# Patient Record
Sex: FEMALE | Race: WHITE | Employment: OTHER | URBAN - METROPOLITAN AREA
[De-identification: names, ages, dates, MRNs, and addresses within clinical notes are randomized per-mention and may not be internally consistent; named-entity substitution may affect disease eponyms.]

---

## 2017-11-30 ENCOUNTER — AMBULATORY - HEALTHEAST (OUTPATIENT)
Dept: NURSING | Facility: CLINIC | Age: 72
End: 2017-11-30

## 2017-12-08 ENCOUNTER — RECORDS - HEALTHEAST (OUTPATIENT)
Dept: ADMINISTRATIVE | Facility: OTHER | Age: 72
End: 2017-12-08

## 2018-05-23 ENCOUNTER — RECORDS - HEALTHEAST (OUTPATIENT)
Dept: ADMINISTRATIVE | Facility: OTHER | Age: 73
End: 2018-05-23

## 2018-06-20 ENCOUNTER — RECORDS - HEALTHEAST (OUTPATIENT)
Dept: ADMINISTRATIVE | Facility: OTHER | Age: 73
End: 2018-06-20

## 2019-07-16 ENCOUNTER — AMBULATORY - HEALTHEAST (OUTPATIENT)
Dept: INTERNAL MEDICINE | Facility: CLINIC | Age: 74
End: 2019-07-16

## 2019-07-16 DIAGNOSIS — R05.9 COUGH: ICD-10-CM

## 2019-07-16 DIAGNOSIS — R91.8 PULMONARY NODULES: ICD-10-CM

## 2019-07-17 ENCOUNTER — HOSPITAL ENCOUNTER (OUTPATIENT)
Dept: RADIOLOGY | Facility: HOSPITAL | Age: 74
Discharge: HOME | End: 2019-07-17
Attending: INTERNAL MEDICINE

## 2019-07-17 DIAGNOSIS — R91.8 PULMONARY NODULES: ICD-10-CM

## 2019-07-17 DIAGNOSIS — R05.9 COUGH: ICD-10-CM

## 2019-07-19 ENCOUNTER — COMMUNICATION - HEALTHEAST (OUTPATIENT)
Dept: INTERNAL MEDICINE | Facility: CLINIC | Age: 74
End: 2019-07-19

## 2019-08-29 ENCOUNTER — OFFICE VISIT - HEALTHEAST (OUTPATIENT)
Dept: INTERNAL MEDICINE | Facility: CLINIC | Age: 74
End: 2019-08-29

## 2019-08-29 ENCOUNTER — COMMUNICATION - HEALTHEAST (OUTPATIENT)
Dept: INTERNAL MEDICINE | Facility: CLINIC | Age: 74
End: 2019-08-29

## 2019-08-29 DIAGNOSIS — Z78.0 POSTMENOPAUSAL STATUS: ICD-10-CM

## 2019-08-29 DIAGNOSIS — R05.8 NOCTURNAL COUGH: ICD-10-CM

## 2019-08-29 DIAGNOSIS — Z71.6 ENCOUNTER FOR TOBACCO USE CESSATION COUNSELING: ICD-10-CM

## 2019-08-29 DIAGNOSIS — M53.3 SACROILIAC JOINT PAIN: ICD-10-CM

## 2019-08-29 DIAGNOSIS — M89.9 BONE DISEASE: ICD-10-CM

## 2019-08-29 DIAGNOSIS — R21 PERIANAL RASH: ICD-10-CM

## 2019-08-29 DIAGNOSIS — Z12.31 VISIT FOR SCREENING MAMMOGRAM: ICD-10-CM

## 2019-08-29 DIAGNOSIS — Z00.00 MEDICARE ANNUAL WELLNESS VISIT, SUBSEQUENT: ICD-10-CM

## 2019-08-29 LAB
ALBUMIN SERPL-MCNC: 3.6 G/DL (ref 3.5–5)
ALBUMIN UR-MCNC: NEGATIVE MG/DL
ALP SERPL-CCNC: 98 U/L (ref 45–120)
ALT SERPL W P-5'-P-CCNC: 10 U/L (ref 0–45)
ANION GAP SERPL CALCULATED.3IONS-SCNC: 8 MMOL/L (ref 5–18)
APPEARANCE UR: ABNORMAL
AST SERPL W P-5'-P-CCNC: 20 U/L (ref 0–40)
ATRIAL RATE - MUSE: 49 BPM
BACTERIA #/AREA URNS HPF: ABNORMAL HPF
BASOPHILS # BLD AUTO: 0.1 THOU/UL (ref 0–0.2)
BASOPHILS NFR BLD AUTO: 1 % (ref 0–2)
BILIRUB DIRECT SERPL-MCNC: 0.2 MG/DL
BILIRUB SERPL-MCNC: 0.4 MG/DL (ref 0–1)
BILIRUB UR QL STRIP: NEGATIVE
BUN SERPL-MCNC: 20 MG/DL (ref 8–28)
CALCIUM SERPL-MCNC: 9.5 MG/DL (ref 8.5–10.5)
CHLORIDE BLD-SCNC: 107 MMOL/L (ref 98–107)
CHOLEST SERPL-MCNC: 201 MG/DL
CO2 SERPL-SCNC: 27 MMOL/L (ref 22–31)
COLOR UR AUTO: YELLOW
CREAT SERPL-MCNC: 0.91 MG/DL (ref 0.6–1.1)
DIASTOLIC BLOOD PRESSURE - MUSE: NORMAL MMHG
EOSINOPHIL # BLD AUTO: 0.5 THOU/UL (ref 0–0.4)
EOSINOPHIL NFR BLD AUTO: 4 % (ref 0–6)
ERYTHROCYTE [DISTWIDTH] IN BLOOD BY AUTOMATED COUNT: 12.1 % (ref 11–14.5)
ERYTHROCYTE [SEDIMENTATION RATE] IN BLOOD BY WESTERGREN METHOD: 23 MM/HR (ref 0–20)
FASTING STATUS PATIENT QL REPORTED: YES
GFR SERPL CREATININE-BSD FRML MDRD: >60 ML/MIN/1.73M2
GLUCOSE BLD-MCNC: 96 MG/DL (ref 70–125)
GLUCOSE UR STRIP-MCNC: NEGATIVE MG/DL
HCT VFR BLD AUTO: 42.7 % (ref 35–47)
HDLC SERPL-MCNC: 41 MG/DL
HGB BLD-MCNC: 14.3 G/DL (ref 12–16)
HGB UR QL STRIP: ABNORMAL
INTERPRETATION ECG - MUSE: NORMAL
KETONES UR STRIP-MCNC: NEGATIVE MG/DL
LDLC SERPL CALC-MCNC: 136 MG/DL
LEUKOCYTE ESTERASE UR QL STRIP: NEGATIVE
LYMPHOCYTES # BLD AUTO: 3.3 THOU/UL (ref 0.8–4.4)
LYMPHOCYTES NFR BLD AUTO: 30 % (ref 20–40)
MCH RBC QN AUTO: 30 PG (ref 27–34)
MCHC RBC AUTO-ENTMCNC: 33.4 G/DL (ref 32–36)
MCV RBC AUTO: 90 FL (ref 80–100)
MONOCYTES # BLD AUTO: 1 THOU/UL (ref 0–0.9)
MONOCYTES NFR BLD AUTO: 9 % (ref 2–10)
MUCOUS THREADS #/AREA URNS LPF: ABNORMAL LPF
NEUTROPHILS # BLD AUTO: 6 THOU/UL (ref 2–7.7)
NEUTROPHILS NFR BLD AUTO: 55 % (ref 50–70)
NITRATE UR QL: NEGATIVE
P AXIS - MUSE: 64 DEGREES
PH UR STRIP: 5.5 [PH] (ref 5–8)
PLATELET # BLD AUTO: 263 THOU/UL (ref 140–440)
PMV BLD AUTO: 8.5 FL (ref 7–10)
POTASSIUM BLD-SCNC: 4.6 MMOL/L (ref 3.5–5)
PR INTERVAL - MUSE: 170 MS
PROT SERPL-MCNC: 7.8 G/DL (ref 6–8)
QRS DURATION - MUSE: 84 MS
QT - MUSE: 416 MS
QTC - MUSE: 375 MS
R AXIS - MUSE: 39 DEGREES
RBC # BLD AUTO: 4.75 MILL/UL (ref 3.8–5.4)
RBC #/AREA URNS AUTO: ABNORMAL HPF
SODIUM SERPL-SCNC: 142 MMOL/L (ref 136–145)
SP GR UR STRIP: 1.02 (ref 1–1.03)
SQUAMOUS #/AREA URNS AUTO: ABNORMAL LPF
SYSTOLIC BLOOD PRESSURE - MUSE: NORMAL MMHG
T AXIS - MUSE: 48 DEGREES
TRIGL SERPL-MCNC: 118 MG/DL
TSH SERPL DL<=0.005 MIU/L-ACNC: 2.61 UIU/ML (ref 0.3–5)
UROBILINOGEN UR STRIP-ACNC: ABNORMAL
VENTRICULAR RATE- MUSE: 49 BPM
WBC #/AREA URNS AUTO: ABNORMAL HPF
WBC: 10.9 THOU/UL (ref 4–11)

## 2019-08-29 ASSESSMENT — MIFFLIN-ST. JEOR: SCORE: 1377.82

## 2019-08-30 ENCOUNTER — COMMUNICATION - HEALTHEAST (OUTPATIENT)
Dept: INTERNAL MEDICINE | Facility: CLINIC | Age: 74
End: 2019-08-30

## 2019-08-30 LAB — 25(OH)D3 SERPL-MCNC: 16.8 NG/ML (ref 30–80)

## 2019-10-07 ENCOUNTER — RECORDS - HEALTHEAST (OUTPATIENT)
Dept: ADMINISTRATIVE | Facility: OTHER | Age: 74
End: 2019-10-07

## 2020-04-09 ENCOUNTER — AMBULATORY - HEALTHEAST (OUTPATIENT)
Dept: INTERNAL MEDICINE | Facility: CLINIC | Age: 75
End: 2020-04-09

## 2020-04-09 DIAGNOSIS — R21 RASH: ICD-10-CM

## 2020-09-23 ENCOUNTER — OFFICE VISIT - HEALTHEAST (OUTPATIENT)
Dept: INTERNAL MEDICINE | Facility: CLINIC | Age: 75
End: 2020-09-23

## 2020-09-23 DIAGNOSIS — I83.93 ASYMPTOMATIC VARICOSE VEINS OF BOTH LOWER EXTREMITIES: ICD-10-CM

## 2020-09-23 DIAGNOSIS — R03.0 ELEVATED BLOOD PRESSURE READING WITHOUT DIAGNOSIS OF HYPERTENSION: ICD-10-CM

## 2020-09-23 DIAGNOSIS — M53.3 SI (SACROILIAC) JOINT DYSFUNCTION: ICD-10-CM

## 2020-09-23 DIAGNOSIS — I65.23 CAROTID ATHEROSCLEROSIS, BILATERAL: ICD-10-CM

## 2020-09-23 DIAGNOSIS — Z23 NEED FOR INFLUENZA VACCINATION: ICD-10-CM

## 2020-09-23 DIAGNOSIS — E66.09 CLASS 1 OBESITY DUE TO EXCESS CALORIES WITHOUT SERIOUS COMORBIDITY WITH BODY MASS INDEX (BMI) OF 34.0 TO 34.9 IN ADULT: ICD-10-CM

## 2020-09-23 DIAGNOSIS — J30.1 SEASONAL ALLERGIC RHINITIS DUE TO POLLEN: ICD-10-CM

## 2020-09-23 DIAGNOSIS — E66.811 CLASS 1 OBESITY DUE TO EXCESS CALORIES WITHOUT SERIOUS COMORBIDITY WITH BODY MASS INDEX (BMI) OF 34.0 TO 34.9 IN ADULT: ICD-10-CM

## 2020-09-23 DIAGNOSIS — K57.30 DIVERTICULAR DISEASE OF LARGE INTESTINE: ICD-10-CM

## 2020-09-23 DIAGNOSIS — E56.9 VITAMIN DEFICIENCY: ICD-10-CM

## 2020-09-23 DIAGNOSIS — E78.5 HYPERLIPIDEMIA LDL GOAL <100: ICD-10-CM

## 2020-09-23 DIAGNOSIS — Z72.0 TOBACCO ABUSE: ICD-10-CM

## 2020-09-23 DIAGNOSIS — B35.6 DERMATOPHYTOSIS OF GROIN: ICD-10-CM

## 2020-09-23 LAB
ALBUMIN SERPL-MCNC: 3.6 G/DL (ref 3.5–5)
ALP SERPL-CCNC: 92 U/L (ref 45–120)
ALT SERPL W P-5'-P-CCNC: 10 U/L (ref 0–45)
ANION GAP SERPL CALCULATED.3IONS-SCNC: 10 MMOL/L (ref 5–18)
AST SERPL W P-5'-P-CCNC: 24 U/L (ref 0–40)
BILIRUB SERPL-MCNC: 0.3 MG/DL (ref 0–1)
BUN SERPL-MCNC: 17 MG/DL (ref 8–28)
CALCIUM SERPL-MCNC: 9.2 MG/DL (ref 8.5–10.5)
CHLORIDE BLD-SCNC: 107 MMOL/L (ref 98–107)
CO2 SERPL-SCNC: 24 MMOL/L (ref 22–31)
CREAT SERPL-MCNC: 0.98 MG/DL (ref 0.6–1.1)
GFR SERPL CREATININE-BSD FRML MDRD: 56 ML/MIN/1.73M2
GLUCOSE BLD-MCNC: 102 MG/DL (ref 70–125)
POTASSIUM BLD-SCNC: 4.2 MMOL/L (ref 3.5–5)
PROT SERPL-MCNC: 7.2 G/DL (ref 6–8)
SODIUM SERPL-SCNC: 141 MMOL/L (ref 136–145)

## 2020-09-23 RX ORDER — CETIRIZINE HYDROCHLORIDE 10 MG/1
10 TABLET ORAL DAILY
Status: SHIPPED | COMMUNITY
Start: 2020-09-23

## 2020-09-24 ENCOUNTER — COMMUNICATION - HEALTHEAST (OUTPATIENT)
Dept: INTERNAL MEDICINE | Facility: CLINIC | Age: 75
End: 2020-09-24

## 2020-09-24 ENCOUNTER — AMBULATORY - HEALTHEAST (OUTPATIENT)
Dept: INTERNAL MEDICINE | Facility: CLINIC | Age: 75
End: 2020-09-24

## 2020-09-24 DIAGNOSIS — E55.9 VITAMIN D DEFICIENCY: ICD-10-CM

## 2020-09-24 LAB — 25(OH)D3 SERPL-MCNC: 18.3 NG/ML (ref 30–80)

## 2020-12-18 ENCOUNTER — COMMUNICATION - HEALTHEAST (OUTPATIENT)
Dept: INTERNAL MEDICINE | Facility: CLINIC | Age: 75
End: 2020-12-18

## 2020-12-21 ENCOUNTER — COMMUNICATION - HEALTHEAST (OUTPATIENT)
Dept: INTERNAL MEDICINE | Facility: CLINIC | Age: 75
End: 2020-12-21

## 2021-02-13 ENCOUNTER — COMMUNICATION - HEALTHEAST (OUTPATIENT)
Dept: INTERNAL MEDICINE | Facility: CLINIC | Age: 76
End: 2021-02-13

## 2021-02-13 DIAGNOSIS — R21 RASH: ICD-10-CM

## 2021-02-15 RX ORDER — DESONIDE 0.5 MG/G
CREAM TOPICAL
Qty: 60 G | Refills: 1 | Status: SHIPPED | OUTPATIENT
Start: 2021-02-15 | End: 2021-09-09

## 2021-05-30 NOTE — TELEPHONE ENCOUNTER
----- Message from Juice Machuca MD sent at 7/16/2019 10:00 PM CDT -----  Regarding: cough  Call patient    Lets get a cxr this week and see what it shows-at any [address]  I will call you today Wednesday or Thursday    Juice Machuca MD  Internal medicine  [facility]  [phone]  [email or web address]

## 2021-05-31 NOTE — PROGRESS NOTES
Assessment and Plan:        1. Medicare annual wellness visit, subsequent  Completed  - 1(CBC and Differential)  - Erythrocyte Sedimentation Rate  - Urinalysis-UC if Indicated  - Basic Metabolic Panel  - Hepatic Profile  - Lipid Cascade  - Thyroid Cascade  - Vitamin D, Total (25-Hydroxy)  - Electrocardiogram Perform - Clinic  - 1 (CBC with Diff)    2. Visit for screening mammogram     - Mammo Screening Bilateral; Future    3. Nocturnal cough  Chronic.  With sinus drainage.  Recent x-ray negative.  No hoarseness.  Tobacco cessation.  Smokes about 2 cigarettes a week    4. Encounter for tobacco use cessation counseling  Completed    5. Sacroiliac joint pain  Trial Celebrex  X-ray  Cortisone injection if no change  - XR Pelvis AP; Future  - meloxicam (MOBIC) 15 MG tablet; Take 1 tablet (15 mg total) by mouth daily. For sacro iliac pain  Dispense: 30 tablet; Refill: 11    6. Perianal rash  Discontinue harsh cleansing  Barrier cream  - triamcinolone (KENALOG) 0.1 % cream; Apply topically 2 (two) times a day for 14 days.  Dispense: 45 g; Refill: 3    7. Postmenopausal status       8. Bone disease      - Vitamin D, Total (25-Hydroxy)     The patient's current medical problems were reviewed.    I have counseled the patient for tobacco cessation and the follow up will occur  at the next visit.  The following health maintenance schedule was reviewed with the patient and provided in printed form in the after visit summary:   Health Maintenance   Topic Date Due     HEPATITIS C SCREENING  1945     ZOSTER VACCINES (1 of 2) 05/12/1997     DXA SCAN  05/12/2012     PNEUMOCOCCAL POLYSACCHARIDE VACCINE AGE 65 AND OVER  05/12/2012     MEDICARE ANNUAL WELLNESS VISIT  05/05/2017     MAMMOGRAM  05/06/2018     INFLUENZA VACCINE RULE BASED (1) 08/01/2019     FALL RISK ASSESSMENT  08/29/2020     ADVANCE CARE PLANNING  05/05/2021     TD 18+ HE  11/26/2024     COLONOSCOPY  12/08/2026     PNEUMOCOCCAL CONJUGATE VACCINE FOR ADULTS  (PCV13 OR PREVNAR)  Completed        In addition to the wellness examination additional time was spent addressing the following listed problems.   Above listed    In doing so, this provider spent greater than 25 min. face-to-face time with the patient and/or his family.  More than half this time was spent in counseling and or coordination of care which was consistent with the nature of this patient's problems which are listed and described in the assessment and plan.          Juice Machuca MD  Internal medicine  [facility]  [phone]  [email or web address]      Subjective:   Chief Complaint: Lisa Jolley is an 74 y.o. female here for an Annual Wellness visit.   HPI: Here for wellness    Follow-up chronic medical issues    Chronic cough  Mild sinus drainage  No hemoptysis  8 cigarettes a month  No hemoptysis  No breathing issues  I had an x-ray performed this summer which was unremarkable  Clear sinus drainage chronic many years.  No purulence  Voice okay    New issue  Pain left lumbar sacral area with some gluteal radiation  She has been moving to home.  Packing unloading etc.  Several months  Notes with relations  He notes with walking  Otherwise negative nocturnal  Does not hurt elsewhere  Has not really taken much for it.  Tylenol PRN Advil not much help.    No hematuria  History of cystoscopy are unremarkable.      Review of Systems:     Appetite is good  No reflux  Normal bowel movement  Some urinary urgency  Mild dyspareunia-dryness   Lubricant aids  Sleep is good    please see above.  The rest of the review of systems are negative for all systems.    Patient Care Team:  Juice Machuca MD as PCP - General (Internal Medicine)     There is no problem list on file for this patient.    Past Medical History:   Diagnosis Date     Cholelithiasis     ct 2013     Chronic cystitis     cysto 7/2013     Chronic sinusitis      Endometriosis      Low serum vitamin D       Obesity     BMI 35     Osteoarthritis      Pulmonary nodule     Right upper lobe-benign      Past Surgical History:   Procedure Laterality Date     CYSTOSCOPY  2006     CYSTOSCOPY N/A 10/31/2016    Procedure: CYSTOSCOPY, BLADDER BIOPSIES;  Surgeon: Remi Montenegro MD;  Location: [facility] OR;  Service:      HYSTERECTOMY       KNEE ARTHROSCOPY Right 2011     NASAL SEPTUM SURGERY        Family History   Problem Relation Age of Onset     Diabetes Mother      Heart disease Father      Melanoma Sister      Diabetes Brother      Hypertension Son       Social History     Socioeconomic History     Marital status:      Spouse name: Not on file     Number of children: Not on file     Years of education: Not on file     Highest education level: Not on file   Occupational History     Not on file   Social Needs     Financial resource strain: Not on file     Food insecurity:     Worry: Not on file     Inability: Not on file     Transportation needs:     Medical: Not on file     Non-medical: Not on file   Tobacco Use     Smoking status: Current Some Day Smoker     Packs/day: 0.25     Smokeless tobacco: Never Used   Substance and Sexual Activity     Alcohol use: Yes     Comment: rare     Drug use: No     Sexual activity: Not on file   Lifestyle     Physical activity:     Days per week: Not on file     Minutes per session: Not on file     Stress: Not on file   Relationships     Social connections:     Talks on phone: Not on file     Gets together: Not on file     Attends Confucianism service: Not on file     Active member of club or organization: Not on file     Attends meetings of clubs or organizations: Not on file     Relationship status: Not on file     Intimate partner violence:     Fear of current or ex partner: Not on file     Emotionally abused: Not on file     Physically abused: Not on file     Forced sexual activity: Not on file   Other Topics Concern     Not on file   Social History Narrative    MARAL     "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 For sacro iliac pain 30 tablet 11     triamcinolone (KENALOG) 0.1 % cream Apply topically 2 (two) times a day for 14 days. 45 g 3     No current facility-administered medications for this visit.       Objective:   Vital Signs:   Visit Vitals  /72 (Patient Site: Left Arm, Patient Position: Sitting, Cuff Size: Adult Large)   Pulse 67   Ht 5' 4.5\" (1.638 m)   Wt 195 lb 1.3 oz (88.5 kg)   LMP  (LMP Unknown)   SpO2 96%   Breastfeeding? No   BMI 32.97 kg/m         VisionScreening:  No exam data present     PHYSICAL EXAM  Pleasant  Mini-Mental Status performed      Skin: Normal. No rash or lesion  Lymph Nodes: None palpable-including neck, axilla, inguinal, epitrochlear.  Head:  Normocephalic.    Eyes: Midline.  Equal size., full ROM.  External exams normal.  No icterus  Ears:  Normal pinnae, canals, and TM's.    Nose:  Patent, without deformity.    Throat:  Moist mucous membranes without lesions, erythema, or exudate.    Neck: No palpable masses, lymphadenopathy or tenderness.No thyromegaly or goiter.  No thyroid nodule.  Carotid Arteries:  No Bruit.  Carotid upstroke normal  Chest Wall: No deformity or pain elicited on compression.  Hypertrophic right navicular sternal joint.  Chronic.  Nontender.  Axilla-negative nodes  Breast-unremarkable no mass  Respiratory:  Normal respiratory effort.  Lungs are clear with good breath sounds.  No dullness.  No wheezing.  Heart: Regular rhythm.  Normal sounding S1, S2 without S3, S4, murmurs, rubs, or gallops.    Abdomen:  The abdomen was flat, soft and nontender without guarding rebound or masses.  There are normal bowel sounds.  There is no hepatosplenomegaly.  There is no palpable enlargement of the aorta.  Rectal- perianal rash.  Intergluteal " rash.  Chronic appearing.  No primary lesion  Extremities:  Full ROM without limitation, deformity or edema.    4+ pulses  Neurologic-intact- No focal deficit.  Speech clear.  Coordination normal.  Strength symmetric  Pain over left SI.  Slight pain with range of motion left hip      Assessment Results 8/29/2019   Activities of Daily Living No help needed   Instrumental Activities of Daily Living No help needed   Get Up and Go Score Less than 12 seconds   Mini Cog Total Score 3   Some recent data might be hidden     A Mini-Cog score of 0-2 suggests the possibility of dementia, score of 3-5 suggests no dementia    Identified Health Risks:     She is at risk for lack of exercise and has been provided with information to increase physical activity for the benefit of her well-being.  Patient's advanced directive was discussed

## 2021-05-31 NOTE — TELEPHONE ENCOUNTER
----- Message from Juice Machuca MD sent at 8/30/2019  2:42 PM CDT -----  Call    Vit d low    rx  Vit d 1000 iu a day  Get otc

## 2021-05-31 NOTE — TELEPHONE ENCOUNTER
Left message to call back for: Lisa  Information to relay to patient:  See below    Radha Argueta CMA ([facility])

## 2021-05-31 NOTE — TELEPHONE ENCOUNTER
----- Message from Juice Machuca MD sent at 8/29/2019  9:46 AM CDT -----  Call patient  Have mild lumbar arthritis  Hip ok

## 2021-05-31 NOTE — TELEPHONE ENCOUNTER
Left message to call back for: Results  Information to relay to patient:  See below message from PCP.

## 2021-06-03 VITALS — BODY MASS INDEX: 32.5 KG/M2 | WEIGHT: 195.08 LBS | HEIGHT: 65 IN

## 2021-06-05 VITALS
SYSTOLIC BLOOD PRESSURE: 140 MMHG | DIASTOLIC BLOOD PRESSURE: 84 MMHG | OXYGEN SATURATION: 94 % | HEART RATE: 73 BPM | WEIGHT: 202.2 LBS | BODY MASS INDEX: 34.17 KG/M2

## 2021-06-11 NOTE — PATIENT INSTRUCTIONS - HE
72-year-old woman, previous patient Dr. Juice Hurst, establishing primary care with me.  Issues are obesity with body mass and active 34, I would like to see her lose about 20 to 25 pounds of the next 6 months.  Mildly elevated systolic blood pressure noted today of 140/84.  Chronic groin dermatitis, for which she gets relief from steroid cream.  Chronic nocturnal cough with sinus drainage, negative chest x-ray July 2019.  Light cigarette smoker of about 10 cigarettes a week, but she needs to stop even that.  Chronic sacroiliac joint pain which is not too bothersome.  Mild atherosclerosis in bilateral carotid arteries demonstrated on ultrasound May 6, 2016.  History of vitamin D deficiency as measured August 29, 2019, recheck vitamin D level today.  Mild hyperlipidemia.  Post menopause, satisfactory bone density scan November 2012, status post hysterectomy.  Due for screening mammogram, last one satisfactory October 7, 2019.  Satisfactory screening colonoscopy most recently performed December 2016, Diverticulosis, does not need any further screening. History of GI bleeding and diagnosed with a duodenal ulcer in 2016  Allergic rhinitis takes daily Zyrtec, okay to continue.  Bilateral varicose veins with spider veins in both thighs.  We will administer seasonal flu vaccine and pneumococcal 23 polysaccharide today.    She is not fasting today, so we will check lipids.  However let us check a comprehensive metabolic panel and vitamin D level.    Going issue by issue:    Obesity with body mass and active 34, I would like to see her lose about 20 to 25 pounds of the next 6 months.  Losing the weight will help with her sacroiliac joint pain, will also help with her varicose veins and make her lipids better.  Fortunately, she has no history of hypertension, no diabetes either.  I asked her to focus on eating slower, controlling portion size, and identifying problem foods to curtail or eliminate, especially starchy foods,  sweets, calorie dense foods.  She to me that alcohol consumption is minimal, practically 0.    Mildly elevated systolic blood pressure noted today of 140/84, but previous blood pressures have been good.  She may be a bit nervous today.  I encouraged her to measure her blood pressure outside the clinic.  She might even to buy her own home machine, save the receipt, and bring the machine to the clinic so that we can validate it against a manual reading.  I would like to see her resting systolic blood pressure less than 130.    Chronic groin dermatitis, for which she gets relief from steroid cream.  Okay to continue the desonide cream for the groin.  This is probably skin irritation from friction.  Does not seem to be fungal.    Chronic nocturnal cough with sinus drainage, negative chest x-ray July 2019.     Light cigarette smoker of about 10 cigarettes a week, but she needs to stop even that.      Chronic sacroiliac joint pain which is not too bothersome.      Mild atherosclerosis in bilateral carotid arteries demonstrated on ultrasound May 6, 2016.  Knowing that she has some deposits in her carotid arteries, thus another reason to stop smoking.    History of vitamin D deficiency as measured August 29, 2019, recheck vitamin D level today.      Mild hyperlipidemia, but no history of cardiovascular events.  Lipid profile from August 29, 2019 showed total cholesterol 201, triglycerides 118, HDL good cholesterol bit low at 41, LDL bad cholesterol bit elevated at 136.  Would recommend a heart healthy diet, for example to Mediterranean diet.    Post menopause, satisfactory bone density scan November 2012, status post hysterectomy.      Due for screening mammogram, last one satisfactory October 7, 2019.      Satisfactory screening colonoscopy most recently performed December 2016, does not need any further screening.  Diverticulosis.    History of GI bleeding and diagnosed with a duodenal ulcer in 2016    Allergic rhinitis  takes daily Zyrtec, okay to continue.      Bilateral varicose veins with spider veins in both thighs.      We will administer seasonal flu vaccine and pneumococcal 23 polysaccharide today.

## 2021-06-11 NOTE — PROGRESS NOTES
Office Visit - Follow Up   Lisa Jolley   75 y.o. female    Date of Visit: 9/23/2020    Chief Complaint   Patient presents with     Establish Care     Est.Care        -------------------------------------------------------------------------------------------------------------------------  Assessment and Plan    72-year-old woman, previous patient Dr. Juice Hurst, establishing primary care with me.  Issues are obesity with body mass and active 34, I would like to see her lose about 20 to 25 pounds of the next 6 months.  Mildly elevated systolic blood pressure noted today of 140/84.  Chronic groin dermatitis, for which she gets relief from steroid cream.  Chronic nocturnal cough with sinus drainage, negative chest x-ray July 2019.  Light cigarette smoker of about 10 cigarettes a week, but she needs to stop even that.  Chronic sacroiliac joint pain which is not too bothersome.  Mild atherosclerosis in bilateral carotid arteries demonstrated on ultrasound May 6, 2016.  History of vitamin D deficiency as measured August 29, 2019, recheck vitamin D level today.  Mild hyperlipidemia.  Post menopause, satisfactory bone density scan November 2012, status post hysterectomy.  Due for screening mammogram, last one satisfactory October 7, 2019.  Satisfactory screening colonoscopy most recently performed December 2016, Diverticulosis, does not need any further screening. History of GI bleeding and diagnosed with a duodenal ulcer in 2016  Allergic rhinitis takes daily Zyrtec, okay to continue.  Bilateral varicose veins with spider veins in both thighs.  We will administer seasonal flu vaccine and pneumococcal 23 polysaccharide today.    She is not fasting today, so we will check lipids.  However let us check a comprehensive metabolic panel and vitamin D level.    Going issue by issue:    Obesity with body mass and active 34, I would like to see her lose about 20 to 25 pounds of the next 6 months.  Losing the weight will  help with her sacroiliac joint pain, will also help with her varicose veins and make her lipids better.  Fortunately, she has no history of hypertension, no diabetes either.  I asked her to focus on eating slower, controlling portion size, and identifying problem foods to curtail or eliminate, especially starchy foods, sweets, calorie dense foods.  She to me that alcohol consumption is minimal, practically 0.    Mildly elevated systolic blood pressure noted today of 140/84, but previous blood pressures have been good.  She may be a bit nervous today.  I encouraged her to measure her blood pressure outside the clinic.  She might even to buy her own home machine, save the receipt, and bring the machine to the clinic so that we can validate it against a manual reading.  I would like to see her resting systolic blood pressure less than 130.    Chronic groin dermatitis, for which she gets relief from steroid cream.  Okay to continue the desonide cream for the groin.  This is probably skin irritation from friction.  Does not seem to be fungal.    Chronic nocturnal cough with sinus drainage, negative chest x-ray July 2019.     Light cigarette smoker of about 10 cigarettes a week, but she needs to stop even that.      Chronic sacroiliac joint pain which is not too bothersome.      Mild atherosclerosis in bilateral carotid arteries demonstrated on ultrasound May 6, 2016.  Knowing that she has some deposits in her carotid arteries, thus another reason to stop smoking.    History of vitamin D deficiency as measured August 29, 2019, recheck vitamin D level today.      Mild hyperlipidemia, but no history of cardiovascular events.  Lipid profile from August 29, 2019 showed total cholesterol 201, triglycerides 118, HDL good cholesterol bit low at 41, LDL bad cholesterol bit elevated at 136.  Would recommend a heart healthy diet, for example to Mediterranean diet.    Post menopause, satisfactory bone density scan November 2012,  status post hysterectomy.      Due for screening mammogram, last one satisfactory October 7, 2019.      Satisfactory screening colonoscopy most recently performed December 2016, does not need any further screening.  Diverticulosis.    History of GI bleeding and diagnosed with a duodenal ulcer in 2016    Allergic rhinitis takes daily Zyrtec, okay to continue.      Bilateral varicose veins with spider veins in both thighs.      We will administer seasonal flu vaccine and pneumococcal 23 polysaccharide today.      --------------------------------------------------------------------------------------------------------------------------  History of Present Illness  This 75 y.o. old  woman, previous patient Dr. Juice Hurst, establishing primary care with me.  Issues are obesity with body mass and active 34, I would like to see her lose about 20 to 25 pounds of the next 6 months.  Mildly elevated systolic blood pressure noted today of 140/84.  Chronic groin dermatitis, for which she gets relief from steroid cream.  Chronic nocturnal cough with sinus drainage, negative chest x-ray July 2019.  Light cigarette smoker of about 10 cigarettes a week, but she needs to stop even that.  Chronic sacroiliac joint pain which is not too bothersome.  Mild atherosclerosis in bilateral carotid arteries demonstrated on ultrasound May 6, 2016.  History of vitamin D deficiency as measured August 29, 2019, recheck vitamin D level today.  Mild hyperlipidemia.  Post menopause, satisfactory bone density scan November 2012, status post hysterectomy.  Due for screening mammogram, last one satisfactory October 7, 2019.  Satisfactory screening colonoscopy most recently performed December 2016, Diverticulosis, does not need any further screening. History of GI bleeding and diagnosed with a duodenal ulcer in 2016  Allergic rhinitis takes daily Zyrtec, okay to continue.  Bilateral varicose veins with spider veins in both thighs.  We will administer  seasonal flu vaccine and pneumococcal 23 polysaccharide today.    BMI 34  Wt Readings from Last 3 Encounters:   09/23/20 202 lb 3.2 oz (91.7 kg)   08/29/19 195 lb 1.3 oz (88.5 kg)   12/19/16 203 lb 0.6 oz (92.1 kg)     Lab Results   Component Value Date    TSH 2.61 08/29/2019     Dermatitis groin  Steroid cream helps    Nocturnal cough  Chronic.  With sinus drainage.   Recent x-ray negative.  EXAM: XR CHEST 2 VIEWS  LOCATION: [facility]  DATE/TIME: 7/17/2019 9:11 AM  INDICATION: Cough  COMPARISON: 9/17/2016  FINDINGS: Negative chest.     Still smoking: 10 cigs a week  Started age 14, always a light smoker  At most halfa ppd    Sacroiliac joint pain-- not bothersome  Feels a clunk  Stopped meloxicam  EXAM: XR PELVIS AP  LOCATION: [facility]  DATE/TIME: 8/29/2019 9:31 AM  INDICATION: Sacrococcygeal disorders, not elsewhere classified  COMPARISON: None.  FINDINGS: Negative pelvis. No fracture or dislocation. Degenerative changes are seen in the lower lumbar spine.    US CAROTIDS DUPLEX BILAT  5/6/2016 10:45 AM  INDICATION: Asymptomatic left carotid stenosis.  TECHNIQUE: Duplex exam performed utilizing 2D gray-scale imaging, Doppler   interrogation with color-flow and spectral waveform analysis.  COMPARISON: None.  FINDINGS:  RIGHT: There is mild atheromatous plaque. Normal waveforms with no   significant stenosis.  LEFT: There is mild atheromatous plaque. Normal waveforms with no   significant stenosis.    Vit d low  Vitamin D, Total (25-Hydroxy)   Date Value Ref Range Status   08/29/2019 16.8 (L) 30.0 - 80.0 ng/mL Final     Lipids    8/29/19  Cholesterol  <=199 mg/dL  201High       Triglycerides  <=149 mg/dL  118     HDL Cholesterol  >=50 mg/dL  41Low       LDL Calculated  <=129 mg/dL  136High         Postmenopausal status   DEXA 11-  Dual-X-ray Absorptiometry (DXA Results)   AP spine (L1-L4) Left hip (neck) Righ hip (neck) Left hip (total) Right hip (total)    Radius   BMD (gm/cm2) 1.087  0.930 0.990 1.015 1.017             T score -0.9 -0.8 -0.3 0.1 0.1             Z score 0.7 0.7 1.1 1.3 1.3             % 91 90 95 101 101    %change from previous scan dated:   NA   NA NA N/A   %change from baseline BASELINE   BASELINE BASELINE N/A     Diagnosis:  *No evidence of osteopenia/osteoporosis.    The ten year-probability of fx (%)  .  Major osteoporotic: 7.2  .  Hip fracture:   2.4    Varicose veins-- bilateral    Allergic rhinitis   Zyrtec    Col[facility]oscopy at [facility] 8-  Colonoscopy December 8, 2016  Diverticulosis, otherwise normal    MM MAMMOGRAM SCREENING BILAT W CAD performed on 10/7/19     Compared to: 05/06/2016 MAMMO SCREENING W/CAD&TAJ BILAT, 12/19/2012   MAMMOGRAM SCREENING BILATERAL, and 07/11/2005 LV Mammogram     Pneumo Conj 13-V (2010&after) 05/05/2016         Wt Readings from Last 3 Encounters:   09/23/20 202 lb 3.2 oz (91.7 kg)   08/29/19 195 lb 1.3 oz (88.5 kg)   12/19/16 203 lb 0.6 oz (92.1 kg)     BP Readings from Last 3 Encounters:   09/23/20 140/84   08/29/19 114/72   12/19/16 138/76       Lab Results   Component Value Date    WBC 10.9 08/29/2019    HGB 14.3 08/29/2019    HCT 42.7 08/29/2019     08/29/2019    CHOL 201 (H) 08/29/2019    TRIG 118 08/29/2019    HDL 41 (L) 08/29/2019    ALT 10 08/29/2019    AST 20 08/29/2019     08/29/2019    K 4.6 08/29/2019     08/29/2019    CREATININE 0.91 08/29/2019    BUN 20 08/29/2019    CO2 27 08/29/2019    TSH 2.61 08/29/2019      Immunization History   Administered Date(s) Administered     Influenza high dose,seasonal,PF, 65+ yrs 11/30/2017     Influenza, inj, historic,unspecified 09/26/2019     Pneumo Conj 13-V (2010&after) 05/05/2016     Td, Adult, Absorbed 08/30/2005     Tdap 11/26/2014       Review of Systems: A comprehensive review of systems was negative except as noted.  ---------------------------------------------------------------------------------------------------------------------------    Medications,  Allergies, Social, and Problem List   Current Outpatient Medications   Medication Sig Dispense Refill     cetirizine (ZYRTEC) 10 MG tablet Take 10 mg by mouth daily.       desonide (DESOWEN) 0.05 % cream Apply to skin as needed 60 g 1     No current facility-administered medications for this visit.      Allergies   Allergen Reactions     Cat Dander      Dog Dander      Social History     Tobacco Use     Smoking status: Current Some Day Smoker     Packs/day: 0.25     Smokeless tobacco: Never Used   Substance Use Topics     Alcohol use: Yes     Comment: rare     Drug use: No     There is no problem list on file for this patient.       Reviewed, reconciled and updated       Physical Exam   General Appearance:   Very pleasant, obese, normal mental status    /84   Pulse 73   Wt 202 lb 3.2 oz (91.7 kg)   LMP  (LMP Unknown)   SpO2 94%   BMI 34.17 kg/m      General: Alert, in no distress  Skin: No significant lesion seen.  + skin under pannus, mildly erythematous  Eyes/nose/throat: Eyes without scleral icterus, eye movements normal, pupils equal and reactive, oropharynx clear, ears with normal TM's  MSK: Neck with good ROM  Lymphatic: Neck without adenopathy or masses  Endocrine: Thyroid with no nodules to palpation  Pulm: Lungs clear to auscultation bilaterally  Cardiac: Heart with regular rate and rhythm, no murmur or gallop  GI: Abdomen soft, nontender. No palpable enlargement of liver or spleen  MSK: Extremities no tenderness or edema  + varicose veins both legs with spiders in thighs  Neuro: Moves all extremities, without focal weakness  Psych: Alert, normal mental status. Normal affect and speech       Additional Information   I spent 45 minutes face time with the patient, with > 50% counseling, explaining and discussing with the patient the issues enumerated in the Assessment and Plan section of this note and answering questions. Afterwards, the patient was given a printout of the AVS, with attention to  the content in the Patient Instruction section.       Rusty Rojas MD

## 2021-06-13 NOTE — TELEPHONE ENCOUNTER
Patient should have already completed the 12-week course of high-dose weekly ergocalciferol, which was prescribed in September. Now she should take a daily maintenance dose of 8296-9088 units of cholecalciferol, available over-the-counter

## 2021-06-13 NOTE — TELEPHONE ENCOUNTER
Refill request received from [facility] via fax for a refill of Vit D 50,000 U. Order pended.    Last OV/VV on 9/23/2020  Next scheduled appointment with PCP 3/23/2021    Should patient switch to [id number] U OTC daily?    Linette Hinton CMA ............... 1:45 PM, 12/21/20

## 2021-06-15 NOTE — TELEPHONE ENCOUNTER
Refill Request  Did you contact pharmacy: No  Medication name: desonide cream    Who prescribed the medication: Rudolph Rojas  [facility]ed [facility]  Is patient out of medication: Yes  Patient notified refills processed in 3 business days:  yes  Okay to leave a detailed message: yes

## 2021-06-16 PROBLEM — M53.3 SI (SACROILIAC) JOINT DYSFUNCTION: Status: ACTIVE | Noted: 2020-09-23

## 2021-06-16 PROBLEM — I83.93 ASYMPTOMATIC VARICOSE VEINS OF BOTH LOWER EXTREMITIES: Status: ACTIVE | Noted: 2020-09-23

## 2021-06-16 PROBLEM — E55.9 VITAMIN D DEFICIENCY: Status: ACTIVE | Noted: 2020-09-24

## 2021-06-16 PROBLEM — E66.811 CLASS 1 OBESITY DUE TO EXCESS CALORIES IN ADULT: Status: ACTIVE | Noted: 2020-09-23

## 2021-06-16 PROBLEM — Z72.0 TOBACCO ABUSE: Status: ACTIVE | Noted: 2020-09-23

## 2021-06-16 PROBLEM — B35.6 DERMATOPHYTOSIS OF GROIN: Status: ACTIVE | Noted: 2020-09-23

## 2021-06-16 PROBLEM — I65.23 CAROTID ATHEROSCLEROSIS, BILATERAL: Status: ACTIVE | Noted: 2020-09-23

## 2021-06-16 PROBLEM — J30.1 ALLERGIC RHINITIS DUE TO POLLEN: Status: ACTIVE | Noted: 2020-09-23

## 2021-06-16 PROBLEM — R03.0 ELEVATED BLOOD PRESSURE READING WITHOUT DIAGNOSIS OF HYPERTENSION: Status: ACTIVE | Noted: 2020-09-23

## 2021-06-16 PROBLEM — E66.09 CLASS 1 OBESITY DUE TO EXCESS CALORIES IN ADULT: Status: ACTIVE | Noted: 2020-09-23

## 2021-06-16 PROBLEM — E78.5 HYPERLIPIDEMIA LDL GOAL <100: Status: ACTIVE | Noted: 2020-09-23

## 2021-06-17 NOTE — PATIENT INSTRUCTIONS - HE
Patient Instructions by Juice Machuca MD at 8/29/2019  8:00 AM     Author: Juice Machuca MD Service: -- Author Type: Physician    Filed: 8/29/2019  9:24 AM Encounter Date: 8/29/2019 Status: Signed    : Juice Machuca MD (Physician)         Patient Education     Exercise for a Healthier Heart  You may wonder how you can improve the health of your heart. If youre thinking about exercise, youre on the right track. You dont need to become an athlete, but you do need a certain amount of brisk exercise to help strengthen your heart. If you have been diagnosed with a heart condition, your doctor may recommend exercise to help stabilize your condition. To help make exercise a habit, choose safe, fun activities.       Be sure to check with your health care provider before starting an exercise program.    Why exercise?  Exercising regularly offers many healthy rewards. It can help you do all of the following:    Improve your blood cholesterol levels to help prevent further heart trouble    Lower your blood pressure to help prevent a stroke or heart attack    Control diabetes, or reduce your risk of getting this disease    Improve your heart and lung function    Reach and maintain a healthy weight    Make your muscles stronger and more limber so you can stay active    Prevent falls and fractures by slowing the loss of bone mass (osteoporosis)    Manage stress better  Exercise tips  Ease into your routine. Set small goals. Then build on them.  Exercise on most days. Aim for a total of 150 or more minutes of moderate to  vigorous intensity activity each week. Consider 40 minutes, 3 to 4 times a week. For best results, activity should last for 40 minutes on average. It is OK to work up to the 40 minute period over time. Examples of moderate-intensity activity is walking one mile in 15 minutes or 30 to 45 minutes of yard work.  Step up your daily activity level. Along with your exercise program, try being more active  throughout the day. Walk instead of drive. Do more household tasks or yard work.  Choose one or more activities you enjoy. Walking is one of the easiest things you can do. You can also try swimming, riding a bike, or taking an exercise class.  Stop exercising and call your doctor if you:    Have chest pain or feel dizzy or lightheaded    Feel burning, tightness, pressure, or heaviness in your chest, neck, shoulders, back, or arms    Have unusual shortness of breath    Have increased joint or muscle pain    Have palpitations or an irregular heartbeat      [id number] [employer]. [address] [place] [id number]. All rights reserved. This information is not intended as a substitute for professional medical care. Always follow your healthcare professional's instructions.           Advance Directive  Patients advance directive was discussed and I am comfortable with the patients wishes.  Patient Education   Personalized Prevention Plan  You are due for the preventive services outlined below.  Your care team is available to assist you in scheduling these services.  If you have already completed any of these items, please share that information with your care team to update in your medical record.  Health Maintenance   Topic Date Due   ? HEPATITIS C SCREENING  1945   ? ZOSTER VACCINES (1 of 2) 05/12/1997   ? DXA SCAN  05/12/2012   ? PNEUMOCOCCAL POLYSACCHARIDE VACCINE AGE 65 AND OVER  05/12/2012   ? MEDICARE ANNUAL WELLNESS VISIT  05/05/2017   ? MAMMOGRAM  05/06/2018   ? INFLUENZA VACCINE RULE BASED (1) 08/01/2019   ? FALL RISK ASSESSMENT  08/29/2020   ? ADVANCE CARE PLANNING  05/05/2021   ? TD 18+ HE  11/26/2024   ? COLONOSCOPY  12/08/2026   ? PNEUMOCOCCAL CONJUGATE VACCINE FOR ADULTS (PCV13 OR PREVNAR)  Completed

## 2021-06-26 ENCOUNTER — HEALTH MAINTENANCE LETTER (OUTPATIENT)
Age: 76
End: 2021-06-26

## 2021-09-09 ENCOUNTER — TELEPHONE (OUTPATIENT)
Dept: INTERNAL MEDICINE | Facility: CLINIC | Age: 76
End: 2021-09-09

## 2021-09-09 ENCOUNTER — OFFICE VISIT (OUTPATIENT)
Dept: INTERNAL MEDICINE | Facility: CLINIC | Age: 76
End: 2021-09-09
Payer: MEDICARE

## 2021-09-09 VITALS
OXYGEN SATURATION: 98 % | HEIGHT: 65 IN | BODY MASS INDEX: 32.04 KG/M2 | DIASTOLIC BLOOD PRESSURE: 78 MMHG | TEMPERATURE: 97.9 F | HEART RATE: 54 BPM | WEIGHT: 192.3 LBS | SYSTOLIC BLOOD PRESSURE: 118 MMHG

## 2021-09-09 DIAGNOSIS — Z00.00 ROUTINE GENERAL MEDICAL EXAMINATION AT A HEALTH CARE FACILITY: Primary | ICD-10-CM

## 2021-09-09 DIAGNOSIS — I83.93 ASYMPTOMATIC VARICOSE VEINS OF BOTH LOWER EXTREMITIES: ICD-10-CM

## 2021-09-09 DIAGNOSIS — E66.811 CLASS 1 OBESITY DUE TO EXCESS CALORIES WITHOUT SERIOUS COMORBIDITY WITH BODY MASS INDEX (BMI) OF 32.0 TO 32.9 IN ADULT: ICD-10-CM

## 2021-09-09 DIAGNOSIS — I65.23 CAROTID ATHEROSCLEROSIS, BILATERAL: ICD-10-CM

## 2021-09-09 DIAGNOSIS — B35.6 DERMATOPHYTOSIS OF GROIN: ICD-10-CM

## 2021-09-09 DIAGNOSIS — E66.09 CLASS 1 OBESITY DUE TO EXCESS CALORIES WITHOUT SERIOUS COMORBIDITY WITH BODY MASS INDEX (BMI) OF 32.0 TO 32.9 IN ADULT: ICD-10-CM

## 2021-09-09 DIAGNOSIS — Z72.0 TOBACCO ABUSE: ICD-10-CM

## 2021-09-09 DIAGNOSIS — Z12.31 ENCOUNTER FOR SCREENING MAMMOGRAM FOR BREAST CANCER: ICD-10-CM

## 2021-09-09 DIAGNOSIS — E78.5 HYPERLIPIDEMIA LDL GOAL <100: ICD-10-CM

## 2021-09-09 DIAGNOSIS — Z23 NEED FOR IMMUNIZATION AGAINST INFLUENZA: ICD-10-CM

## 2021-09-09 DIAGNOSIS — E55.9 VITAMIN D DEFICIENCY: ICD-10-CM

## 2021-09-09 LAB
ALBUMIN SERPL-MCNC: 3.9 G/DL (ref 3.5–5)
ALP SERPL-CCNC: 97 U/L (ref 45–120)
ALT SERPL W P-5'-P-CCNC: <9 U/L (ref 0–45)
ANION GAP SERPL CALCULATED.3IONS-SCNC: 11 MMOL/L (ref 5–18)
AST SERPL W P-5'-P-CCNC: 20 U/L (ref 0–40)
BILIRUB SERPL-MCNC: 0.5 MG/DL (ref 0–1)
BUN SERPL-MCNC: 15 MG/DL (ref 8–28)
CALCIUM SERPL-MCNC: 9.8 MG/DL (ref 8.5–10.5)
CHLORIDE BLD-SCNC: 105 MMOL/L (ref 98–107)
CHOLEST SERPL-MCNC: 237 MG/DL
CO2 SERPL-SCNC: 27 MMOL/L (ref 22–31)
CREAT SERPL-MCNC: 1.02 MG/DL (ref 0.6–1.1)
ERYTHROCYTE [DISTWIDTH] IN BLOOD BY AUTOMATED COUNT: 13.8 % (ref 10–15)
GFR SERPL CREATININE-BSD FRML MDRD: 54 ML/MIN/1.73M2
GLUCOSE BLD-MCNC: 88 MG/DL (ref 70–125)
HCT VFR BLD AUTO: 47.4 % (ref 35–47)
HDLC SERPL-MCNC: 47 MG/DL
HGB BLD-MCNC: 15.3 G/DL (ref 11.7–15.7)
LDLC SERPL CALC-MCNC: 156 MG/DL
MCH RBC QN AUTO: 29.3 PG (ref 26.5–33)
MCHC RBC AUTO-ENTMCNC: 32.3 G/DL (ref 31.5–36.5)
MCV RBC AUTO: 91 FL (ref 78–100)
PLATELET # BLD AUTO: 243 10E3/UL (ref 150–450)
POTASSIUM BLD-SCNC: 4.6 MMOL/L (ref 3.5–5)
PROT SERPL-MCNC: 7.6 G/DL (ref 6–8)
RBC # BLD AUTO: 5.23 10E6/UL (ref 3.8–5.2)
SODIUM SERPL-SCNC: 143 MMOL/L (ref 136–145)
TRIGL SERPL-MCNC: 172 MG/DL
TSH SERPL DL<=0.005 MIU/L-ACNC: 4.42 UIU/ML (ref 0.3–5)
WBC # BLD AUTO: 11 10E3/UL (ref 4–11)

## 2021-09-09 PROCEDURE — G0439 PPPS, SUBSEQ VISIT: HCPCS | Performed by: INTERNAL MEDICINE

## 2021-09-09 PROCEDURE — G0008 ADMIN INFLUENZA VIRUS VAC: HCPCS | Performed by: INTERNAL MEDICINE

## 2021-09-09 PROCEDURE — 36415 COLL VENOUS BLD VENIPUNCTURE: CPT | Performed by: INTERNAL MEDICINE

## 2021-09-09 PROCEDURE — 90662 IIV NO PRSV INCREASED AG IM: CPT | Performed by: INTERNAL MEDICINE

## 2021-09-09 PROCEDURE — 80053 COMPREHEN METABOLIC PANEL: CPT | Performed by: INTERNAL MEDICINE

## 2021-09-09 PROCEDURE — 84443 ASSAY THYROID STIM HORMONE: CPT | Performed by: INTERNAL MEDICINE

## 2021-09-09 PROCEDURE — 80061 LIPID PANEL: CPT | Performed by: INTERNAL MEDICINE

## 2021-09-09 PROCEDURE — 82306 VITAMIN D 25 HYDROXY: CPT | Performed by: INTERNAL MEDICINE

## 2021-09-09 PROCEDURE — 85027 COMPLETE CBC AUTOMATED: CPT | Performed by: INTERNAL MEDICINE

## 2021-09-09 RX ORDER — FAMOTIDINE 20 MG
50 TABLET ORAL DAILY
COMMUNITY
End: 2021-10-25

## 2021-09-09 RX ORDER — DESONIDE 0.5 MG/G
CREAM TOPICAL 2 TIMES DAILY PRN
Qty: 60 G | Refills: 1 | Status: SHIPPED | OUTPATIENT
Start: 2021-09-09 | End: 2021-12-22

## 2021-09-09 RX ORDER — DESONIDE 0.5 MG/G
CREAM TOPICAL
Qty: 60 G | Refills: 1 | Status: SHIPPED | OUTPATIENT
Start: 2021-09-09 | End: 2021-09-09

## 2021-09-09 ASSESSMENT — MIFFLIN-ST. JEOR: SCORE: 1365.21

## 2021-09-09 ASSESSMENT — ACTIVITIES OF DAILY LIVING (ADL): CURRENT_FUNCTION: NO ASSISTANCE NEEDED

## 2021-09-09 NOTE — TELEPHONE ENCOUNTER
[facility] pharmacy calling regarding prescription for desonide (DESOWEN) 0.05 % external cream.  Per pharmacy, in order to bill insurance for this medication they need to know the frequency that patient will be using this cream.      Angeles Godwin

## 2021-09-09 NOTE — PROGRESS NOTES
"SUBJECTIVE:   Lisa Jolley is a 76 year old female who presents for Preventive Visit.    Patient has been advised of split billing requirements and indicates understanding: Yes   Are you in the first 12 months of your Medicare coverage?  No    Healthy Habits:     In general, how would you rate your overall health?  Good    Frequency of exercise:  None    Do you usually eat at least 4 servings of fruit and vegetables a day, include whole grains    & fiber and avoid regularly eating high fat or \"junk\" foods?  No    Taking medications regularly:  Yes    Medication side effects:  None    Ability to successfully perform activities of daily living:  No assistance needed    Home Safety:  No safety concerns identified    Hearing Impairment:  No hearing concerns    In the past 6 months, have you been bothered by leaking of urine?  No    In general, how would you rate your overall mental or emotional health?  Good      PHQ-2 Total Score: 0    Additional concerns today:  No    Do you feel safe in your environment? Yes    Have you ever done Advance Care Planning? (For example, a Health Directive, POLST, or a discussion with a medical provider or your loved ones about your wishes): Yes, advance care planning is on file.    Fall risk  Fallen 2 or more times in the past year?: No  Any fall with injury in the past year?: No    Cognitive Screening   1) Repeat 3 items (Leader, Season, Table)    2) Clock draw: NORMAL  3) 3 item recall: Recalls 2 objects   Results: NORMAL clock, 1-2 items recalled: COGNITIVE IMPAIRMENT LESS LIKELY    Mini-CogTM Copyright TIMOTHY Ortiz. Licensed by the author for use in [facility]; reprinted with permission (zane@.[facility]). All rights reserved.      Do you have sleep apnea, excessive snoring or daytime drowsiness?: yes    Reviewed and updated as needed this visit by clinical staff  Tobacco  Allergies  Meds              Reviewed and updated as needed this visit by Provider              " "  Social History     Tobacco Use     Smoking status: Current Some Day Smoker     Packs/day: 0.25     Smokeless tobacco: Never Used   Substance Use Topics     Alcohol use: Yes     Comment: Alcoholic Drinks/day: rare       Alcohol Use 9/9/2021   Prescreen: >3 drinks/day or >7 drinks/week? No       Current providers sharing in care for this patient include:   Patient Care Team:  Rusty Rojas MD as PCP - General  Rusty Rojas MD as Assigned PCP    The following health maintenance items are reviewed in Epic and correct as of today:  Health Maintenance Due   Topic Date Due     DEXA  Never done     ANNUAL REVIEW OF HM ORDERS  Never done     HEPATITIS C SCREENING  Never done     ZOSTER IMMUNIZATION (1 of 2) Never done     FALL RISK ASSESSMENT  08/29/2020     MAMMO SCREENING  10/07/2021       Review of Systems  Constitutional, HEENT, cardiovascular, pulmonary, GI, , musculoskeletal, neuro, skin, endocrine and psych systems are negative, except as otherwise noted.    OBJECTIVE:   /78 (BP Location: Right arm, Patient Position: Sitting, Cuff Size: Adult Large)   Pulse 54   Temp 97.9  F (36.6  C) (Oral)   Ht 1.638 m (5' 4.5\")   Wt 87.2 kg (192 lb 4.8 oz)   SpO2 98%   BMI 32.50 kg/m   Estimated body mass index is 32.5 kg/m  as calculated from the following:    Height as of this encounter: 1.638 m (5' 4.5\").    Weight as of this encounter: 87.2 kg (192 lb 4.8 oz).  Physical Exam    General: Alert, in no distress  Skin: No significant lesion seen.  + skin under pannus, mildly erythematous  Eyes/nose/throat: Eyes without scleral icterus, eye movements normal, pupils equal and reactive, oropharynx clear, ears with normal TM's  MSK: Neck with good ROM  Lymphatic: Neck without adenopathy or masses  Endocrine: Thyroid with no nodules to palpation  Pulm: Lungs clear to auscultation bilaterally  Cardiac: Heart with regular rate and rhythm, no murmur or gallop  GI: Abdomen soft, nontender. No palpable " enlargement of liver or spleen  MSK: Extremities no tenderness or edema  + varicose veins both legs with spiders in thighs  Neuro: Moves all extremities, without focal weakness  Psych: Alert, normal mental status. Normal affect and speech    ASSESSMENT / PLAN:     Annual wellness visit, doing well since our establish care meeting of September 23, 2020.    We will send her for routine monitoring lab test this morning, which will be a comprehensive metabolic panel, blood cell counts, lipid panel, check vitamin D level and thyroid cascade.    Screening mammogram ordered.  Flu shot administered today.  She will need a Covid vaccine booster probably November or December       Going issue by issue:     Obesity with body mass and active 32.5, I would like to see her lose about 20 to 25 pounds of the next 6 months.  Losing the weight will help with her sacroiliac joint pain, will also help with her varicose veins and make her lipids better.  Fortunately, she has no history of hypertension, no diabetes either.  I asked her to focus on eating slower, controlling portion size, and identifying problem foods to curtail or eliminate, especially starchy foods, sweets, calorie dense foods.  She to me that alcohol consumption is minimal, practically 0.     Chronic groin dermatitis, for which she gets relief from steroid cream.  Okay to continue the desonide cream for the groin.  This is probably skin irritation from friction.  Does not seem to be fungal.     Chronic nocturnal cough with sinus drainage, negative chest x-ray July 2019.      Light cigarette smoker of about 10 cigarettes a week, but she needs to stop even that.       Chronic sacroiliac joint pain which is not too bothersome.       Mild atherosclerosis in bilateral carotid arteries demonstrated on ultrasound May 6, 2016.  Knowing that she has some deposits in her carotid arteries, thus another reason to stop smoking.     History of vitamin D deficiency as measured August  "29, 2019, recheck vitamin D level today.    I suggested a maintenance dose of vitamin D 2000 units a day.    Mild hyperlipidemia, but no history of cardiovascular events.  Lipid profile from August 29, 2019 showed total cholesterol 201, triglycerides 118, HDL good cholesterol bit low at 41, LDL bad cholesterol bit elevated at 136.  Would recommend a heart healthy diet, for example to Mediterranean diet.     Post menopause, satisfactory bone density scan November 2012, status post hysterectomy.       Due for screening mammogram, last one satisfactory October 7, 2019.       Satisfactory screening colonoscopy most recently performed December 2016, does not need any further screening.  Diverticulosis.     History of GI bleeding and diagnosed with a duodenal ulcer in 2016     Allergic rhinitis takes daily Zyrtec, okay to continue.       Bilateral varicose veins with spider veins in both thighs.      Moderna COVID vaccine second shot March 15, 2021  Current on pneumococcal vaccine  Seasonal flu vaccine administered today September 9, 2021    Immunization History   Administered Date(s) Administered     COVID-19,PF,Moderna 02/03/2021, 03/15/2021     Flu 65+ Years 09/26/2019     Flu, Unspecified 09/26/2019     Influenza (High Dose) 3 valent vaccine 11/30/2017, 11/06/2018     Influenza, Quad, High Dose, Pf, 65yr+ (Fluzone HD) 09/23/2020, 09/09/2021     Pneumo Conj 13-V (2010&after) 05/05/2016     Pneumococcal 23 valent 09/23/2020     Td (Adult), Adsorbed 08/30/2005     Tdap (Adacel,Boostrix) 11/26/2014       Patient has been advised of split billing requirements and indicates understanding: Yes  COUNSELING:  Reviewed preventive health counseling, as reflected in patient instructions       Healthy diet/nutrition    Estimated body mass index is 32.5 kg/m  as calculated from the following:    Height as of this encounter: 1.638 m (5' 4.5\").    Weight as of this encounter: 87.2 kg (192 lb 4.8 oz).    Weight management plan: " Discussed healthy diet and exercise guidelines    She reports that she has been smoking. She has been smoking about 0.25 packs per day. She has never used smokeless tobacco.  Tobacco Cessation Action Plan:   Self help information given to patient      Appropriate preventive services were discussed with this patient, including applicable screening as appropriate for cardiovascular disease, diabetes, osteopenia/osteoporosis, and glaucoma.  As appropriate for age/gender, discussed screening for colorectal cancer, prostate cancer, breast cancer, and cervical cancer. Checklist reviewing preventive services available has been given to the patient.    Reviewed patients plan of care and provided an AVS. The Basic Care Plan (routine screening as documented in Health Maintenance) for Lisa meets the Care Plan requirement. This Care Plan has been established and reviewed with the Patient.    Counseling Resources:    WENDY KELLEY MD  [facility]    Identified Health Risks:

## 2021-09-09 NOTE — PATIENT INSTRUCTIONS
Annual wellness visit, doing well since our establish care meeting of September 23, 2020.    We will send her for routine monitoring lab test this morning, which will be a comprehensive metabolic panel, blood cell counts, lipid panel, check vitamin D level and thyroid cascade.    Screening mammogram ordered.  Flu shot administered today.  She will need a Covid vaccine booster probably November or December      Going issue by issue:     Obesity with body mass and active 32.5, I would like to see her lose about 20 to 25 pounds of the next 6 months.  Losing the weight will help with her sacroiliac joint pain, will also help with her varicose veins and make her lipids better.  Fortunately, she has no history of hypertension, no diabetes either.  I asked her to focus on eating slower, controlling portion size, and identifying problem foods to curtail or eliminate, especially starchy foods, sweets, calorie dense foods.  She to me that alcohol consumption is minimal, practically 0.     Chronic groin dermatitis, for which she gets relief from steroid cream.  Okay to continue the desonide cream for the groin.  This is probably skin irritation from friction.  Does not seem to be fungal.     Chronic nocturnal cough with sinus drainage, negative chest x-ray July 2019.      Light cigarette smoker of about 10 cigarettes a week, but she needs to stop even that.       Chronic sacroiliac joint pain which is not too bothersome.       Mild atherosclerosis in bilateral carotid arteries demonstrated on ultrasound May 6, 2016.  Knowing that she has some deposits in her carotid arteries, thus another reason to stop smoking.     History of vitamin D deficiency as measured August 29, 2019, recheck vitamin D level today.    I suggested a maintenance dose of vitamin D 2000 units a day.    Mild hyperlipidemia, but no history of cardiovascular events.  Lipid profile from August 29, 2019 showed total cholesterol 201, triglycerides 118, HDL good  cholesterol bit low at 41, LDL bad cholesterol bit elevated at 136.  Would recommend a heart healthy diet, for example to Mediterranean diet.     Post menopause, satisfactory bone density scan November 2012, status post hysterectomy.       Due for screening mammogram, last one satisfactory October 7, 2019.       Satisfactory screening colonoscopy most recently performed December 2016, does not need any further screening.  Diverticulosis.     History of GI bleeding and diagnosed with a duodenal ulcer in 2016     Allergic rhinitis takes daily Zyrtec, okay to continue.       Bilateral varicose veins with spider veins in both thighs.      Moderna COVID vaccine second shot March 15, 2021  Current on pneumococcal vaccine  Seasonal flu vaccine administered today September 9, 2021

## 2021-09-10 LAB — DEPRECATED CALCIDIOL+CALCIFEROL SERPL-MC: 35 UG/L (ref 30–80)

## 2021-09-11 ENCOUNTER — MYC MEDICAL ADVICE (OUTPATIENT)
Dept: INTERNAL MEDICINE | Facility: CLINIC | Age: 76
End: 2021-09-11

## 2021-09-11 DIAGNOSIS — E78.5 HYPERLIPIDEMIA LDL GOAL <100: Primary | ICD-10-CM

## 2021-10-01 RX ORDER — SIMVASTATIN 10 MG
10 TABLET ORAL AT BEDTIME
Qty: 90 TABLET | Refills: 3 | Status: SHIPPED | OUTPATIENT
Start: 2021-10-01 | End: 2022-07-16

## 2021-10-25 ENCOUNTER — OFFICE VISIT (OUTPATIENT)
Dept: FAMILY MEDICINE | Facility: CLINIC | Age: 76
End: 2021-10-25
Payer: MEDICARE

## 2021-10-25 VITALS
RESPIRATION RATE: 16 BRPM | HEIGHT: 64 IN | WEIGHT: 192 LBS | SYSTOLIC BLOOD PRESSURE: 132 MMHG | TEMPERATURE: 97.9 F | DIASTOLIC BLOOD PRESSURE: 74 MMHG | BODY MASS INDEX: 32.78 KG/M2 | HEART RATE: 76 BPM

## 2021-10-25 DIAGNOSIS — Z01.818 PREOP EXAMINATION: Primary | ICD-10-CM

## 2021-10-25 DIAGNOSIS — G89.29 CHRONIC PAIN OF RIGHT KNEE: ICD-10-CM

## 2021-10-25 DIAGNOSIS — Z72.0 TOBACCO ABUSE: ICD-10-CM

## 2021-10-25 DIAGNOSIS — M25.561 CHRONIC PAIN OF RIGHT KNEE: ICD-10-CM

## 2021-10-25 DIAGNOSIS — E66.09 CLASS 1 OBESITY DUE TO EXCESS CALORIES WITHOUT SERIOUS COMORBIDITY WITH BODY MASS INDEX (BMI) OF 32.0 TO 32.9 IN ADULT: ICD-10-CM

## 2021-10-25 DIAGNOSIS — E66.811 CLASS 1 OBESITY DUE TO EXCESS CALORIES WITHOUT SERIOUS COMORBIDITY WITH BODY MASS INDEX (BMI) OF 32.0 TO 32.9 IN ADULT: ICD-10-CM

## 2021-10-25 PROBLEM — R03.0 ELEVATED BLOOD PRESSURE READING WITHOUT DIAGNOSIS OF HYPERTENSION: Status: RESOLVED | Noted: 2020-09-23 | Resolved: 2021-10-25

## 2021-10-25 PROBLEM — B35.6 DERMATOPHYTOSIS OF GROIN: Status: RESOLVED | Noted: 2020-09-23 | Resolved: 2021-10-25

## 2021-10-25 LAB
ANION GAP SERPL CALCULATED.3IONS-SCNC: 11 MMOL/L (ref 5–18)
ATRIAL RATE - MUSE: 56 BPM
BUN SERPL-MCNC: 21 MG/DL (ref 8–28)
CALCIUM SERPL-MCNC: 9.6 MG/DL (ref 8.5–10.5)
CHLORIDE BLD-SCNC: 105 MMOL/L (ref 98–107)
CO2 SERPL-SCNC: 23 MMOL/L (ref 22–31)
CREAT SERPL-MCNC: 0.95 MG/DL (ref 0.6–1.1)
DIASTOLIC BLOOD PRESSURE - MUSE: NORMAL MMHG
GFR SERPL CREATININE-BSD FRML MDRD: 59 ML/MIN/1.73M2
GLUCOSE BLD-MCNC: 96 MG/DL (ref 70–125)
HGB BLD-MCNC: 14.3 G/DL (ref 11.7–15.7)
INTERPRETATION ECG - MUSE: NORMAL
P AXIS - MUSE: 58 DEGREES
POTASSIUM BLD-SCNC: 4.5 MMOL/L (ref 3.5–5)
PR INTERVAL - MUSE: 154 MS
QRS DURATION - MUSE: 80 MS
QT - MUSE: 412 MS
QTC - MUSE: 397 MS
R AXIS - MUSE: 21 DEGREES
SODIUM SERPL-SCNC: 139 MMOL/L (ref 136–145)
SYSTOLIC BLOOD PRESSURE - MUSE: NORMAL MMHG
T AXIS - MUSE: 41 DEGREES
VENTRICULAR RATE- MUSE: 56 BPM

## 2021-10-25 PROCEDURE — 80048 BASIC METABOLIC PNL TOTAL CA: CPT | Performed by: FAMILY MEDICINE

## 2021-10-25 PROCEDURE — 36415 COLL VENOUS BLD VENIPUNCTURE: CPT | Performed by: FAMILY MEDICINE

## 2021-10-25 PROCEDURE — 99214 OFFICE O/P EST MOD 30 MIN: CPT | Performed by: FAMILY MEDICINE

## 2021-10-25 PROCEDURE — 93005 ELECTROCARDIOGRAM TRACING: CPT | Performed by: FAMILY MEDICINE

## 2021-10-25 PROCEDURE — 85018 HEMOGLOBIN: CPT | Performed by: FAMILY MEDICINE

## 2021-10-25 PROCEDURE — 93010 ELECTROCARDIOGRAM REPORT: CPT | Mod: OFF | Performed by: INTERNAL MEDICINE

## 2021-10-25 ASSESSMENT — MIFFLIN-ST. JEOR: SCORE: 1355.91

## 2021-10-25 NOTE — PROGRESS NOTES
[facility]  [address] [address]  Phone: [phone]  Fax: [phone]  Primary Provider: Rusty Rojas  Pre-op Performing Provider: DONNIE MURDOCK      PREOPERATIVE EVALUATION:  Today's date: 10/25/2021    Lisa Jolley is a 76 year old female who presents for a preoperative evaluation.    Surgical Information:  Surgery/Procedure: Arthroscopy right knee  Surgery Location: [facility]  Surgeon: Dr Winn  Surgery Date: 10/26/21  Time of Surgery: 11 am  Where patient plans to recover: At home with family  Fax number for surgical facility: [phone]    Type of Anesthesia Anticipated: General    Assessment & Plan     The proposed surgical procedure is considered INTERMEDIATE risk.    Preop examination    - EKG 12-lead, tracing only  - Hemoglobin; Future  - Basic metabolic panel; Future  - Hemoglobin  - Basic metabolic panel    Chronic pain of right knee      Class 1 obesity due to excess calories without serious comorbidity with body mass index (BMI) of 32.0 to 32.9 in adult      Tobacco abuse             Risks and Recommendations:  The patient has the following additional risks and recommendations for perioperative complications:  Social and Substance:    - Active nicotine user, advised smoking cessation    Medication Instructions:  Patient is to take all scheduled medications on the day of surgery    RECOMMENDATION:  APPROVAL GIVEN to proceed with proposed procedure, without further diagnostic evaluation.    Review of external notes as documented above   Review of the result(s) of each unique test - labs, EKG                  Subjective     HPI related to upcoming procedure: Patient is here today for preoperative consultation.  She usually sees my esteemed colleague and internal medicine but was able to get in with me today for preop that she is having surgery tomorrow.  She is having a knee arthroscopy done again by Dr. Perry with [facility].  She has no contraindications to surgery and will do the usual precautionary labs here today.    Preop Questions 10/21/2021   1. Have you ever had a heart attack or stroke? No   2. Have you ever had surgery on your heart or blood vessels, such as a stent placement, a coronary artery bypass, or surgery on an artery in your head, neck, heart, or legs? No   3. Do you have chest pain with activity? No   4. Do you have a history of  heart failure? No   5. Do you currently have a cold, bronchitis or symptoms of other infection? No   6. Do you have a cough, shortness of breath, or wheezing? No   7. Do you or anyone in your family have previous history of blood clots? No   8. Do you or does anyone in your family have a serious bleeding problem such as prolonged bleeding following surgeries or cuts? No   9. Have you ever had problems with anemia or been told to take iron pills? No   10. Have you had any abnormal blood loss such as black, tarry or bloody stools, or abnormal vaginal bleeding? No   11. Have you ever had a blood transfusion? No   12. Are you willing to have a blood transfusion if it is medically needed before, during, or after your surgery? Yes   13. Have you or any of your relatives ever had problems with anesthesia? No   14. Do you have sleep apnea, excessive snoring or daytime drowsiness? No   15. Do you have any artifical heart valves or other implanted medical devices like a pacemaker, defibrillator, or continuous glucose monitor? No   16. Do you have artificial joints? No   17. Are you allergic to latex? No       Health Care Directive:  Patient does not have a Health Care Directive or Living Will: Discussed advance care planning with patient; however, patient declined at this time.    Preoperative Review of :   reviewed - no record of controlled substances prescribed.      Status of Chronic Conditions:  See problem list for active medical problems.  Problems all longstanding and stable,  except as noted/documented.  See ROS for pertinent symptoms related to these conditions.      Review of Systems  CONSTITUTIONAL: NEGATIVE for fever, chills, change in weight  INTEGUMENTARY/SKIN: NEGATIVE for worrisome rashes, moles or lesions  EYES: NEGATIVE for vision changes or irritation  ENT/MOUTH: NEGATIVE for ear, mouth and throat problems  RESP: NEGATIVE for significant cough or SOB  CV: NEGATIVE for chest pain, palpitations or peripheral edema  GI: NEGATIVE for nausea, abdominal pain, heartburn, or change in bowel habits  : NEGATIVE for frequency, dysuria, or hematuria  MUSCULOSKELETAL: NEGATIVE for significant arthralgias or myalgia  NEURO: NEGATIVE for weakness, dizziness or paresthesias  ENDOCRINE: NEGATIVE for temperature intolerance, skin/hair changes  HEME: NEGATIVE for bleeding problems  PSYCHIATRIC: NEGATIVE for changes in mood or affect    Patient Active Problem List    Diagnosis Date Noted     Vitamin D deficiency 09/24/2020     Priority: Medium     Class 1 obesity due to excess calories in adult 09/23/2020     Priority: Medium     Elevated blood pressure reading without diagnosis of hypertension 09/23/2020     Priority: Medium     Dermatophytosis of groin 09/23/2020     Priority: Medium     Tobacco abuse 09/23/2020     Priority: Medium     SI (sacroiliac) joint dysfunction 09/23/2020     Priority: Medium     Carotid atherosclerosis, bilateral-- US May 2016 09/23/2020     Priority: Medium     Hyperlipidemia LDL goal <100 09/23/2020     Priority: Medium     Allergic rhinitis due to pollen 09/23/2020     Priority: Medium     Asymptomatic varicose veins of both lower extremities 09/23/2020     Priority: Medium     Diverticular disease of large intestine 12/08/2016     Priority: Medium      Past Medical History:   Diagnosis Date     Cholelithiasis     ct 2013     Chronic cystitis     cysto 7/2013     Chronic sinusitis      Endometriosis      Low serum vitamin D      Obesity     BMI 35      "Osteoarthritis      Pulmonary nodule     Right upper lobe-benign     Past Surgical History:   Procedure Laterality Date     CYSTOSCOPY  2006     CYSTOSCOPY N/A 10/31/2016    Procedure: CYSTOSCOPY, BLADDER BIOPSIES;  Surgeon: Remi Montenegro MD;  Location: [facility] [place];  Service:      HYSTERECTOMY       NASAL SEPTUM SURGERY       OTHER SURGICAL HISTORY Right 2011    KNEE ARTHROSCOPY     Current Outpatient Medications   Medication Sig Dispense Refill     cetirizine (ZYRTEC) 10 MG tablet [CETIRIZINE (ZYRTEC) 10 MG TABLET] Take 10 mg by mouth daily.       desonide (DESOWEN) 0.05 % external cream Apply topically 2 times daily as needed (for rash) 60 g 1     simvastatin (ZOCOR) 10 MG tablet Take 1 tablet (10 mg) by mouth At Bedtime 90 tablet 3     Vitamin D, Cholecalciferol, 25 MCG (1000 UT) CAPS Take 50 mcg by mouth daily (Patient not taking: Reported on 10/25/2021)         Allergies   Allergen Reactions     Cat Dander [Animal Dander] Unknown     Dog Dander [Dog Epithelium] Unknown        Social History     Tobacco Use     Smoking status: Current Some Day Smoker     Packs/day: 0.25     Smokeless tobacco: Never Used   Substance Use Topics     Alcohol use: Yes     Comment: Alcoholic Drinks/day: rare     Family History   Problem Relation Age of Onset     Diabetes Mother      Heart Disease Father      Melanoma Sister      Diabetes Brother      Hypertension Son      History   Drug Use No         Objective     /74   Pulse 76   Temp 97.9  F (36.6  C)   Resp 16   Ht 1.626 m (5' 4\")   Wt 87.1 kg (192 lb)   BMI 32.96 kg/m      Physical Exam    GENERAL APPEARANCE: healthy, alert and no distress     EYES: EOMI, PERRL     HENT: ear canals and TM's normal and nose and mouth without ulcers or lesions     NECK: no adenopathy, no asymmetry, masses, or scars and thyroid normal to palpation     RESP: lungs clear to auscultation - no rales, rhonchi or wheezes     CV: regular rates and rhythm, normal S1 S2, no S3 or S4 " and no murmur, click or rub     ABDOMEN:  soft, nontender, no HSM or masses and bowel sounds normal     MS: extremities normal- no gross deformities noted, no evidence of inflammation in joints, FROM in all extremities.     SKIN: no suspicious lesions or rashes     NEURO: Normal strength and tone, sensory exam grossly normal, mentation intact and speech normal     PSYCH: mentation appears normal. and affect normal/bright     LYMPHATICS: No cervical adenopathy    Recent Labs   Lab Test 09/09/21  0934 09/23/20  1500   HGB 15.3  --      --     141   POTASSIUM 4.6 4.2   CR 1.02 0.98        Diagnostics:  Labs pending at this time.  Results will be reviewed when available.   EKG: appears normal, NSR, normal axis, normal intervals, no acute ST/T changes c/w ischemia, no LVH by voltage criteria, unchanged from previous tracings    Revised Cardiac Risk Index (RCRI):  The patient has the following serious cardiovascular risks for perioperative complications:   - No serious cardiac risks = 0 points     RCRI Interpretation: 0 points: Class I (very low risk - 0.4% complication rate)           Signed Electronically by: Jhonny Granados MD  Copy of this evaluation report is provided to requesting physician.

## 2021-10-27 ENCOUNTER — HOSPITAL ENCOUNTER (OUTPATIENT)
Dept: MAMMOGRAPHY | Facility: CLINIC | Age: 76
Discharge: HOME | End: 2021-10-27
Attending: INTERNAL MEDICINE | Admitting: INTERNAL MEDICINE
Payer: MEDICARE

## 2021-10-27 DIAGNOSIS — Z12.31 ENCOUNTER FOR SCREENING MAMMOGRAM FOR BREAST CANCER: ICD-10-CM

## 2021-10-27 PROCEDURE — 77067 SCR MAMMO BI INCL CAD: CPT

## 2021-12-20 DIAGNOSIS — B35.6 DERMATOPHYTOSIS OF GROIN: ICD-10-CM

## 2021-12-22 RX ORDER — DESONIDE 0.5 MG/G
CREAM TOPICAL
Qty: 60 G | Refills: 1 | Status: SHIPPED | OUTPATIENT
Start: 2021-12-22 | End: 2023-01-16

## 2021-12-22 NOTE — TELEPHONE ENCOUNTER
Outpatient Medication Detail     Disp Refills Start End ASAD   desonide (DESOWEN) 0.05 % cream 60 g 1 2/15/2021  No   Sig: Apply to skin as needed   Sent to pharmacy as: desonide 0.05 % topical cream (DESOWEN)   E-Prescribing Status: Receipt confirmed by pharmacy (2/15/2021  2:04 PM CST)     Routing refill request to provider for review/approval because:  Drug not on the [facility] refill protocol     Last Written Prescription Date:  9/9/21  Last Fill Quantity: 60 g,  # refills: 1   Last office visit provider:  9/9/21     Requested Prescriptions   Pending Prescriptions Disp Refills     desonide (DESOWEN) 0.05 % external cream [Pharmacy Med Name: DESONIDE 0.05% CREAM 60GM] 60 g 1     Sig: APPLY TO THE AFFECTED AREA AS NEEDED       There is no refill protocol information for this order          Narayan Clemons RN 12/22/21 4:51 PM

## 2022-04-12 ENCOUNTER — OFFICE VISIT (OUTPATIENT)
Dept: INTERNAL MEDICINE | Facility: CLINIC | Age: 77
End: 2022-04-12
Payer: MEDICARE

## 2022-04-12 VITALS
HEART RATE: 55 BPM | DIASTOLIC BLOOD PRESSURE: 80 MMHG | OXYGEN SATURATION: 99 % | HEIGHT: 64 IN | BODY MASS INDEX: 33.63 KG/M2 | WEIGHT: 197 LBS | SYSTOLIC BLOOD PRESSURE: 136 MMHG
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
## 2022-04-12 PROCEDURE — 99213 OFFICE O/P EST LOW 20 MIN: CPT | Performed by: INTERNAL MEDICINE

## 2022-04-12 NOTE — PROGRESS NOTES
"  Assessment & Plan   Problem List Items Addressed This Visit    None     Visit Diagnoses     Accident due to mechanical fall without injury, sequela    -  Primary           Mechanical fall yesterday while coming out of the car.  I reassured them that I did not see any evidence of a cardiogenic cause of her fall nor a neurologic cause.  We discussed being careful and cognizant of moving slowly while changing positions.  If she falls any more frequently obviously we should see her back for further evaluation.  Otherwise follow-up with us as needed.    No follow-ups on file.    Pito Chambers MD  [facility]    Lovely Gonzalez is a very pleasant 76-year-old patient of Dr. Rojas who comes in today for evaluation of a fall she suffered yesterday.  She states that she was getting out of the passenger side of her car, and felt as she was tipping over.  She then fell on the right side of her body on the curb, hitting her forehead along with the right lateral hip.  She denies any dizziness prior to the fall and denies any palpitations, chest pain, or shortness of breath.  No headaches prior to the fall.  She states that this is happened a couple of times over the last couple of years where she tends to fall suddenly but with no prodrome of any symptoms.  She never loses consciousness and has never been significantly injured from these.  She is otherwise feeling well today.  Her  is concerned with some memory loss that she has been having over the last couple of years and are looking into pursuing this further.      Objective    /80 (BP Location: Right arm, Patient Position: Sitting, Cuff Size: Adult Large)   Pulse 55   Ht 1.626 m (5' 4\")   Wt 89.4 kg (197 lb)   SpO2 99%   BMI 33.81 kg/m    Body mass index is 33.81 kg/m .  Physical Exam     Neurologic: Cranial nerves II through XII are intact.   strength is within normal limits bilaterally.  Lower extremity strength " is within normal limits bilaterally.  She is able to rise from a seated position without using her hands.

## 2022-04-26 ENCOUNTER — OFFICE VISIT (OUTPATIENT)
Dept: INTERNAL MEDICINE | Facility: CLINIC | Age: 77
End: 2022-04-26
Payer: MEDICARE

## 2022-04-26 VITALS
OXYGEN SATURATION: 98 % | WEIGHT: 192 LBS | SYSTOLIC BLOOD PRESSURE: 120 MMHG | DIASTOLIC BLOOD PRESSURE: 78 MMHG | BODY MASS INDEX: 32.96 KG/M2 | HEART RATE: 53 BPM

## 2022-04-26 DIAGNOSIS — R55 PRE-SYNCOPE: Primary | ICD-10-CM

## 2022-04-26 DIAGNOSIS — M40.04 POSTURAL KYPHOSIS OF THORACIC REGION: ICD-10-CM

## 2022-04-26 DIAGNOSIS — M54.2 NECK PAIN: ICD-10-CM

## 2022-04-26 DIAGNOSIS — Z86.39 PERSONAL HISTORY OF OTHER ENDOCRINE, NUTRITIONAL AND METABOLIC DISEASE: ICD-10-CM

## 2022-04-26 DIAGNOSIS — E78.5 HYPERLIPIDEMIA LDL GOAL <100: ICD-10-CM

## 2022-04-26 DIAGNOSIS — M54.9 UPPER BACK PAIN: ICD-10-CM

## 2022-04-26 LAB
ANION GAP SERPL CALCULATED.3IONS-SCNC: 13 MMOL/L (ref 5–18)
BUN SERPL-MCNC: 20 MG/DL (ref 8–28)
CALCIUM SERPL-MCNC: 9.7 MG/DL (ref 8.5–10.5)
CHLORIDE BLD-SCNC: 106 MMOL/L (ref 98–107)
CHOLEST SERPL-MCNC: 160 MG/DL
CO2 SERPL-SCNC: 25 MMOL/L (ref 22–31)
CREAT SERPL-MCNC: 1.12 MG/DL (ref 0.6–1.1)
ERYTHROCYTE [DISTWIDTH] IN BLOOD BY AUTOMATED COUNT: 13.7 % (ref 10–15)
FASTING STATUS PATIENT QL REPORTED: ABNORMAL
GFR SERPL CREATININE-BSD FRML MDRD: 51 ML/MIN/1.73M2
GLUCOSE BLD-MCNC: 109 MG/DL (ref 70–125)
HCT VFR BLD AUTO: 44 % (ref 35–47)
HDLC SERPL-MCNC: 45 MG/DL
HGB BLD-MCNC: 14.4 G/DL (ref 11.7–15.7)
LDLC SERPL CALC-MCNC: 94 MG/DL
MCH RBC QN AUTO: 29.9 PG (ref 26.5–33)
MCHC RBC AUTO-ENTMCNC: 32.7 G/DL (ref 31.5–36.5)
MCV RBC AUTO: 91 FL (ref 78–100)
PLATELET # BLD AUTO: 254 10E3/UL (ref 150–450)
POTASSIUM BLD-SCNC: 4.4 MMOL/L (ref 3.5–5)
RBC # BLD AUTO: 4.82 10E6/UL (ref 3.8–5.2)
SODIUM SERPL-SCNC: 144 MMOL/L (ref 136–145)
TRIGL SERPL-MCNC: 104 MG/DL
TSH SERPL DL<=0.005 MIU/L-ACNC: 4.07 UIU/ML (ref 0.3–5)
WBC # BLD AUTO: 10.9 10E3/UL (ref 4–11)

## 2022-04-26 PROCEDURE — 80061 LIPID PANEL: CPT | Performed by: INTERNAL MEDICINE

## 2022-04-26 PROCEDURE — 80048 BASIC METABOLIC PNL TOTAL CA: CPT | Performed by: INTERNAL MEDICINE

## 2022-04-26 PROCEDURE — 84443 ASSAY THYROID STIM HORMONE: CPT | Performed by: INTERNAL MEDICINE

## 2022-04-26 PROCEDURE — 99214 OFFICE O/P EST MOD 30 MIN: CPT | Performed by: INTERNAL MEDICINE

## 2022-04-26 PROCEDURE — 36415 COLL VENOUS BLD VENIPUNCTURE: CPT | Performed by: INTERNAL MEDICINE

## 2022-04-26 PROCEDURE — 85027 COMPLETE CBC AUTOMATED: CPT | Performed by: INTERNAL MEDICINE

## 2022-04-26 NOTE — PATIENT INSTRUCTIONS
"3 symptoms that she comes in for today    We will have her stop by the laboratory this morning since she is fasting and check lipid panel, CBC, and basic metabolic panel and TSH thyroid test    1.  Pre-syncope and falling, Spells of Lightheadedness and falling once or twice around 3 times a year for approximately the last 4 years  I asked her to describe the spells in detail.  She says that she will usually be sitting or standing, and then feel a fullness in the head.  Then she will fall to the floor, does not lose consciousness, and then after a few seconds she is okay. She denies chest pain or shortness of breath    I would consider the possibility of vasovagal reflex presyncope, or cardiac arrhythmia.  Doubt seizure.  She does not take any blood pressure medication.    I am going to order for her a 30-day cardiac event monitor to look for signs of episodic rhythm disturbance.  I would remind her to stay well-hydrated.    2.  Lapses of short-term memory, which do not bother her, but her  and kids have noticed  Sounds like minor lapses of forgetfulness, which she tends to laugh off  I think is probably mild age-related cognitive impairment, and I told her that some aspect of that could be considered \"normal\", but I told her that she has risk factors for accelerated cognitive decline, because she has signs of vascular disease, including ongoing cigarette smoking.  I told her the main thing is to preserve her cognitive function and memory over time are maintaining her general health as well as possible, especially cardiovascular health, and staying physically and mentally active.  I told her that the various supplements that she might see advertised on TV, such as Prevagen, do not have any evidence that they work, and they are expensive    3. Pain and stiffness in her upper back and shoulders, which I think is on the basis of her thoracic kyphosis and the fact that her neck is chronically forward flexed.  We " will have her see physical therapy to work on posture and upper back muscles and neck strengthening.    Obesity with body mass and active 32.5, I would like to see her lose about 20 to 25 pounds of the next 6 months.  Losing the weight will help with her sacroiliac joint pain, will also help with her varicose veins and make her lipids better.  Fortunately, she has no history of hypertension, no diabetes either.  I asked her to focus on eating slower, controlling portion size, and identifying problem foods to curtail or eliminate, especially starchy foods, sweets, calorie dense foods.  She to me that alcohol consumption is minimal, practically 0.  Wt Readings from Last 5 Encounters:   04/26/22 87.1 kg (192 lb)   04/12/22 89.4 kg (197 lb)   10/25/21 87.1 kg (192 lb)   09/09/21 87.2 kg (192 lb 4.8 oz)   09/23/20 91.7 kg (202 lb 3.2 oz)      Elevated hematocrit 47.4 measured September 9, 2021.  We will recheck that today April 26, 2022.  Could relate to cigarette smoking, and I told her that the carbon monoxide in cigarette smoke can trigger the bone marrow to produce elevated levels of red blood cells.  Another reason to stop smoking.    Chronic groin dermatitis, for which she gets relief from steroid cream.  Okay to continue the desonide cream for the groin.  This is probably skin irritation from friction.  Does not seem to be fungal.     Chronic nocturnal cough with sinus drainage, negative chest x-ray July 2019.      Light cigarette smoker of about 10 cigarettes a week, but she needs to stop even that.       Chronic sacroiliac joint pain which is not too bothersome.       Mild atherosclerosis in bilateral carotid arteries demonstrated on ultrasound May 6, 2016.  Knowing that she has some deposits in her carotid arteries, thus another reason to stop smoking.     History of vitamin D deficiency as measured August 29, 2019, recheck vitamin D level today.    I suggested a maintenance dose of vitamin D 2000 units a  day.     Mild hyperlipidemia, but no history of cardiovascular events, started on low-dose simvastatin October 2021, target LDL under 100.  Lipid profile from August 29, 2019 showed total cholesterol 201, triglycerides 118, HDL good cholesterol bit low at 41, LDL bad cholesterol bit elevated at 136.  Would recommend a heart healthy diet, for example to Mediterranean diet.   Lipid panel from September 9, 2021 showed that the LDL cholesterol was elevated 156. started on low-dose simvastatin October 2021, target LDL under 100.       Post menopause, satisfactory bone density scan November 2012, status post hysterectomy.       Due for screening mammogram, last one satisfactory October 7, 2019.       Satisfactory screening colonoscopy most recently performed December 2016, does not need any further screening.  Diverticulosis.     History of GI bleeding and diagnosed with a duodenal ulcer in 2016    Allergic rhinitis takes daily Zyrtec, okay to continue.       Bilateral varicose veins with spider veins in both thighs.       Moderna COVID vaccine second shot March 15, 2021, then she recalls having had 2 boosters after that, most recently was April 2022  Current on pneumococcal vaccine

## 2022-04-26 NOTE — PROGRESS NOTES
"Office Visit - Follow Up   Lisa Jolley   76 year old female    Date of Visit: 4/26/2022    Chief Complaint   Patient presents with     Dizziness     Sometimes falls when this happens.     Memory Loss     Possible referral        -------------------------------------------------------------------------------------------------------------------------  Assessment and Plan    3 symptoms that she comes in for today    We will have her stop by the laboratory this morning since she is fasting and check lipid panel, CBC, and basic metabolic panel and TSH thyroid test    1. Pre-syncope and falling, Spells of Lightheadedness and falling once or twice around 3 times a year for approximately the last 4 years  I asked her to describe the spells in detail.  She says that she will usually be sitting or standing, and then feel a fullness in the head.  Then she will fall to the floor, does not lose consciousness, and then after a few seconds she is okay. She denies chest pain or shortness of breath    I would consider the possibility of vasovagal reflex presyncope, or cardiac arrhythmia.  Doubt seizure.  She does not take any blood pressure medication.    I am going to order for her a 30-day cardiac event monitor to look for signs of episodic rhythm disturbance.  I would remind her to stay well-hydrated.    2.  Lapses of short-term memory, which do not bother her, but her  and kids have noticed  Sounds like minor lapses of forgetfulness, which she tends to laugh off  I think is probably mild age-related cognitive impairment, and I told her that some aspect of that could be considered \"normal\", but I told her that she has risk factors for accelerated cognitive decline, because she has signs of vascular disease, including ongoing cigarette smoking.  I told her the main thing is to preserve her cognitive function and memory over time are maintaining her general health as well as possible, especially cardiovascular health, " and staying physically and mentally active.  I told her that the various supplements that she might see advertised on TV, such as Prevagen, do not have any evidence that they work, and they are expensive    3. Pain and stiffness in her upper back and shoulders, which I think is on the basis of her thoracic kyphosis and the fact that her neck is chronically forward flexed.  We will have her see physical therapy to work on posture and upper back muscles and neck strengthening.    Obesity with body mass and active 32.5, I would like to see her lose about 20 to 25 pounds of the next 6 months.  Losing the weight will help with her sacroiliac joint pain, will also help with her varicose veins and make her lipids better.  Fortunately, she has no history of hypertension, no diabetes either.  I asked her to focus on eating slower, controlling portion size, and identifying problem foods to curtail or eliminate, especially starchy foods, sweets, calorie dense foods.  She to me that alcohol consumption is minimal, practically 0.  Wt Readings from Last 5 Encounters:   04/26/22 87.1 kg (192 lb)   04/12/22 89.4 kg (197 lb)   10/25/21 87.1 kg (192 lb)   09/09/21 87.2 kg (192 lb 4.8 oz)   09/23/20 91.7 kg (202 lb 3.2 oz)      Elevated hematocrit 47.4 measured September 9, 2021.  We will recheck that today April 26, 2022.  Could relate to cigarette smoking, and I told her that the carbon monoxide in cigarette smoke can trigger the bone marrow to produce elevated levels of red blood cells.  Another reason to stop smoking.    Chronic groin dermatitis, for which she gets relief from steroid cream.  Okay to continue the desonide cream for the groin.  This is probably skin irritation from friction.  Does not seem to be fungal.     Chronic nocturnal cough with sinus drainage, negative chest x-ray July 2019.      Light cigarette smoker of about 10 cigarettes a week, but she needs to stop even that.       Chronic sacroiliac joint pain which  is not too bothersome.       Mild atherosclerosis in bilateral carotid arteries demonstrated on ultrasound May 6, 2016.  Knowing that she has some deposits in her carotid arteries, thus another reason to stop smoking.     History of vitamin D deficiency as measured August 29, 2019, recheck vitamin D level today.    I suggested a maintenance dose of vitamin D 2000 units a day.     Mild hyperlipidemia, but no history of cardiovascular events, started on low-dose simvastatin October 2021, target LDL under 100.  Lipid profile from August 29, 2019 showed total cholesterol 201, triglycerides 118, HDL good cholesterol bit low at 41, LDL bad cholesterol bit elevated at 136.  Would recommend a heart healthy diet, for example to Mediterranean diet.   Lipid panel from September 9, 2021 showed that the LDL cholesterol was elevated 156. started on low-dose simvastatin October 2021, target LDL under 100.       Post menopause, satisfactory bone density scan November 2012, status post hysterectomy.       Due for screening mammogram, last one satisfactory October 7, 2019.       Satisfactory screening colonoscopy most recently performed December 2016, does not need any further screening.  Diverticulosis.     History of GI bleeding and diagnosed with a duodenal ulcer in 2016    Allergic rhinitis takes daily Zyrtec, okay to continue.       Bilateral varicose veins with spider veins in both thighs.       Moderna COVID vaccine second shot March 15, 2021, then she recalls having had 2 boosters after that, most recently was April 2022  Current on pneumococcal vaccine    --------------------------------------------------------------------------------------------------------------------------  History of Present Illness  This 76 year old old     3 symptoms that she comes in for today    We will have her stop by the laboratory this morning since she is fasting and check lipid panel, CBC, and basic metabolic panel and TSH thyroid test    1.  "Pre-syncope and falling, Spells of Lightheadedness and falling once or twice around 3 times a year for approximately the last 4 years  I asked her to describe the spells in detail.  She says that she will usually be sitting or standing, and then feel a fullness in the head.  Then she will fall to the floor, does not lose consciousness, and then after a few seconds she is okay. She denies chest pain or shortness of breath    I would consider the possibility of vasovagal reflex presyncope, or cardiac arrhythmia.  Doubt seizure.  She does not take any blood pressure medication.    I am going to order for her a 30-day cardiac event monitor to look for signs of episodic rhythm disturbance.  I would remind her to stay well-hydrated.    2.  Lapses of short-term memory, which do not bother her, but her  and kids have noticed  Sounds like minor lapses of forgetfulness, which she tends to laugh off  I think is probably mild age-related cognitive impairment, and I told her that some aspect of that could be considered \"normal\", but I told her that she has risk factors for accelerated cognitive decline, because she has signs of vascular disease, including ongoing cigarette smoking.  I told her the main thing is to preserve her cognitive function and memory over time are maintaining her general health as well as possible, especially cardiovascular health, and staying physically and mentally active.  I told her that the various supplements that she might see advertised on TV, such as Prevagen, do not have any evidence that they work, and they are expensive    3. Pain and stiffness in her upper back and shoulders, which I think is on the basis of her thoracic kyphosis and the fact that her neck is chronically forward flexed.  We will have her see physical therapy to work on posture and upper back muscles and neck strengthening.      Wt Readings from Last 3 Encounters:   04/26/22 87.1 kg (192 lb)   04/12/22 89.4 kg (197 lb) "   10/25/21 87.1 kg (192 lb)     BP Readings from Last 3 Encounters:   04/26/22 120/78   04/12/22 136/80   10/25/21 132/74       ---------------------------------------------------------------------------------------------------------------------------    Medications, Allergies, Social, and Problem List   Current Outpatient Medications   Medication Sig Dispense Refill     cetirizine (ZYRTEC) 10 MG tablet [CETIRIZINE (ZYRTEC) 10 MG TABLET] Take 10 mg by mouth daily.       desonide (DESOWEN) 0.05 % external cream APPLY TO THE AFFECTED AREA AS NEEDED 60 g 1     simvastatin (ZOCOR) 10 MG tablet Take 1 tablet (10 mg) by mouth At Bedtime 90 tablet 3     Allergies   Allergen Reactions     Cat Dander [Animal Dander] Unknown     Dog Dander [Dog Epithelium] Unknown     Social History     Tobacco Use     Smoking status: Current Some Day Smoker     Packs/day: 0.25     Smokeless tobacco: Never Used   Substance Use Topics     Alcohol use: Yes     Comment: Alcoholic Drinks/day: rare     Drug use: No     Patient Active Problem List   Diagnosis     Diverticular disease of large intestine     Class 1 obesity due to excess calories in adult     Tobacco abuse     SI (sacroiliac) joint dysfunction     Carotid atherosclerosis, bilateral-- [place] May 2016     Hyperlipidemia LDL goal <100     Allergic rhinitis due to pollen     Asymptomatic varicose veins of both lower extremities     Vitamin D deficiency        Reviewed, reconciled and updated       Physical Exam   General Appearance:       /78   Pulse 53   Wt 87.1 kg (192 lb)   SpO2 98%   BMI 32.96 kg/m      She was good this morning, rises easily from seated to standing.  I measured her blood pressure in the standing position right arm with a large cuff and got 110/70 which is similar to her seated reading, therefore I did not observe orthostatic hypotension  Lungs clear  Heart regular rhythm  Abdomen nontender  Extremities no edema.  She is steady on her feet, did not see any  ataxia  Thoracic kyphosis, with forward flexed neck       Additional Information   I spent 30 minutes on this encounter, including reviewing interval history since last visit, examining the patient, explaining and counseling the issues enumerated in the Assessment and Plan (patient given a copy), ordering indicated tests  ]     WENDY KELLEY MD, MD  Answers for HPI/ROS submitted by the patient on 4/26/2022  How many servings of fruits and vegetables do you eat daily?: 0-1  On average, how many sweetened beverages do you drink each day (Examples: soda, juice, sweet tea, etc.  Do NOT count diet or artificially sweetened beverages)?: 1  How many minutes a day do you exercise enough to make your heart beat faster?: 9 or less  How many days a week do you exercise enough to make your heart beat faster?: 3 or less  How many days per week do you miss taking your medication?: 0  What is the reason for your visit today?: fainting spell. memory diminishing  When did your symptoms begin?: More than a month  What are your symptoms?: Fainted getting out of car and could not rise by myself,  memory diminishment evident over last year in minor symptoms  How would you describe these symptoms?: Moderate  Are your symptoms:: Staying the same

## 2022-05-02 ENCOUNTER — HOSPITAL ENCOUNTER (OUTPATIENT)
Dept: CARDIOLOGY | Facility: HOSPITAL | Age: 77
Discharge: HOME | End: 2022-05-02
Attending: INTERNAL MEDICINE | Admitting: INTERNAL MEDICINE
Payer: MEDICARE

## 2022-05-02 DIAGNOSIS — R55 PRE-SYNCOPE: ICD-10-CM

## 2022-05-02 PROCEDURE — 93270 REMOTE 30 DAY ECG REV/REPORT: CPT

## 2022-05-03 ENCOUNTER — TRANSFERRED RECORDS (OUTPATIENT)
Dept: HEALTH INFORMATION MANAGEMENT | Facility: CLINIC | Age: 77
End: 2022-05-03
Payer: MEDICARE

## 2022-06-02 PROCEDURE — 93228 REMOTE 30 DAY ECG REV/REPORT: CPT | Performed by: INTERNAL MEDICINE

## 2022-06-15 ENCOUNTER — TRANSFERRED RECORDS (OUTPATIENT)
Dept: HEALTH INFORMATION MANAGEMENT | Facility: CLINIC | Age: 77
End: 2022-06-15
Payer: MEDICARE

## 2022-07-15 DIAGNOSIS — E78.5 HYPERLIPIDEMIA LDL GOAL <100: ICD-10-CM

## 2022-07-16 RX ORDER — SIMVASTATIN 10 MG
TABLET ORAL
Qty: 90 TABLET | Refills: 2 | Status: SHIPPED | OUTPATIENT
Start: 2022-07-16 | End: 2023-08-15

## 2022-07-16 NOTE — TELEPHONE ENCOUNTER
"Last Written Prescription Date:  10/1/21  Last Fill Quantity: 90,  # refills: 3   Last office visit provider:  4/26/22     Requested Prescriptions   Pending Prescriptions Disp Refills     simvastatin (ZOCOR) 10 MG tablet [Pharmacy Med Name: SIMVASTATIN 10MG TABLETS] 90 tablet 3     Sig: TAKE 1 TABLET(10 MG) BY MOUTH AT BEDTIME       Statins Protocol Passed - 7/15/2022  1:30 PM        Passed - LDL on file in past 12 months     Recent Labs   Lab Test 04/26/22  0741   LDL 94             Passed - No abnormal creatine kinase in past 12 months     No lab results found.             Passed - Recent (12 mo) or future (30 days) visit within the authorizing provider's specialty     Patient has had an office visit with the authorizing provider or a provider within the authorizing providers department within the previous 12 mos or has a future within next 30 days. See \"Patient Info\" tab in inbasket, or \"Choose Columns\" in Meds & Orders section of the refill encounter.              Passed - Medication is active on med list        Passed - Patient is age 18 or older        Passed - No active pregnancy on record        Passed - No positive pregnancy test in past 12 months             Emmy Fountain, RN 07/16/22 3:34 PM  "

## 2022-07-27 ENCOUNTER — OFFICE VISIT (OUTPATIENT)
Dept: INTERNAL MEDICINE | Facility: CLINIC | Age: 77
End: 2022-07-27
Payer: MEDICARE

## 2022-07-27 VITALS
WEIGHT: 188 LBS | SYSTOLIC BLOOD PRESSURE: 130 MMHG | HEART RATE: 61 BPM | BODY MASS INDEX: 32.27 KG/M2 | DIASTOLIC BLOOD PRESSURE: 68 MMHG | OXYGEN SATURATION: 97 %

## 2022-07-27 DIAGNOSIS — E66.09 CLASS 1 OBESITY DUE TO EXCESS CALORIES WITHOUT SERIOUS COMORBIDITY WITH BODY MASS INDEX (BMI) OF 32.0 TO 32.9 IN ADULT: ICD-10-CM

## 2022-07-27 DIAGNOSIS — E66.811 CLASS 1 OBESITY DUE TO EXCESS CALORIES WITHOUT SERIOUS COMORBIDITY WITH BODY MASS INDEX (BMI) OF 32.0 TO 32.9 IN ADULT: ICD-10-CM

## 2022-07-27 DIAGNOSIS — Z72.0 TOBACCO ABUSE: ICD-10-CM

## 2022-07-27 DIAGNOSIS — J01.01 ACUTE RECURRENT MAXILLARY SINUSITIS: Primary | ICD-10-CM

## 2022-07-27 DIAGNOSIS — E78.5 HYPERLIPIDEMIA LDL GOAL <100: ICD-10-CM

## 2022-07-27 PROCEDURE — 99213 OFFICE O/P EST LOW 20 MIN: CPT | Performed by: INTERNAL MEDICINE

## 2022-07-27 RX ORDER — AZITHROMYCIN 250 MG/1
TABLET, FILM COATED ORAL
Qty: 6 TABLET | Refills: 0 | Status: SHIPPED | OUTPATIENT
Start: 2022-07-27 | End: 2022-08-01

## 2022-07-27 NOTE — PROGRESS NOTES
Office Visit - Follow Up   Lisa Jolley   77 year old female    Date of Visit: 7/27/2022    Chief Complaint   Patient presents with     Follow Up     3 mo follow up.        -------------------------------------------------------------------------------------------------------------------------  Assessment and Plan    Follow multiple issues, Lisa is doing really well.  She is been under some stress helping her son who was in his early 50s who has had some recent traumatic medical problems including a stroke    But Lisa seems to be coping pretty well.  She has managed to lose about 5 pounds since our previous meeting of April 2022.  Blood pressure looks decent.  Her only daily prescription pill is simvastatin for cholesterol, and we check a lipid panel April 26, 2022 which showed good control with LDL less than 100.    He does not need any laboratory testing today.    Just 1 new issue to mention today    Recurring sinusitis, for which I will equip her with a Z-Reese to have on hand, which is worked for her in the past  Chronic nocturnal cough with sinus drainage, negative chest x-ray July 2019.     Pre-syncope and falling, Spells of Lightheadedness and falling once or twice around 3 times a year for approximately the last 4 years--no recurrence of spells since our previous meeting of April 2022    I asked her to describe the spells in detail.  She says that she will usually be sitting or standing, and then feel a fullness in the head.  Then she will fall to the floor, does not lose consciousness, and then after a few seconds she is okay. She denies chest pain or shortness of breath. I would consider the possibility of vasovagal reflex presyncope, or cardiac arrhythmia.  Doubt seizure.  She does not take any blood pressure medication.  And I put the results of that heart rhythm recording    Cardiac event monitoring from 5/2/2022 to 5/31/2022 (monitored duration 23d 15h 56m).  Baseline rhythm was sinus  "rhythm 91bpm.    Reported heart rate range 36 to 186bpm, average 69bpm.  1 symptom triggers (other) correlated to sinus rhythm 63bpm.  19 automated recordings included single occurrence of nonsustained ventricular tachycardia (6 beats, 186bpm), isolated PVCs, intermittent first degree atrioventricular block, sinus bradycardia (lowest 36bpm 09:39).  No sustained tachyarrhythmias  No atrial fibrillation  There were no pauses noted.  Supraventricular and ventricular ectopic beat frequency are not reported on this monitoring modality.      Lapses of short-term memory, which do not bother her, but her  and kids have noticed  Sounds like minor lapses of forgetfulness, which she tends to laugh off  I think is probably mild age-related cognitive impairment, and I told her that some aspect of that could be considered \"normal\", but I told her that she has risk factors for accelerated cognitive decline, because she has signs of vascular disease, including ongoing cigarette smoking.  I told her the main thing is to preserve her cognitive function and memory over time are maintaining her general health as well as possible, especially cardiovascular health, and staying physically and mentally active.  I told her that the various supplements that she might see advertised on TV, such as Prevagen, do not have any evidence that they work, and they are expensive     Pain and stiffness in her upper back and shoulders, which I think is on the basis of her thoracic kyphosis and the fact that her neck is chronically forward flexed.    Saw physical therapy to work on posture and upper back muscles and neck strengthening.     Obesity with body mass and active 32.5,  Trimmed off between 5-10 pounds between April and July 2022  Losing the weight will help with her sacroiliac joint pain, will also help with her varicose veins and make her lipids better.  Fortunately, she has no history of hypertension, no diabetes either.  I asked her " to focus on eating slower, controlling portion size, and identifying problem foods to curtail or eliminate, especially starchy foods, sweets, calorie dense foods.  She to me that alcohol consumption is minimal, practically 0.    Wt Readings from Last 5 Encounters:   07/27/22 85.3 kg (188 lb)   04/26/22 87.1 kg (192 lb)   04/12/22 89.4 kg (197 lb)   10/25/21 87.1 kg (192 lb)   09/09/21 87.2 kg (192 lb 4.8 oz)     Normalized hemoglobin and hematocrit on CBC April 26, 2022     Chronic groin dermatitis, for which she gets relief from steroid cream.  Okay to continue the desonide cream for the groin.  This is probably skin irritation from friction.  Does not seem to be fungal.     Light cigarette smoker of about 10 cigarettes a week, but she needs to stop even that.       Chronic sacroiliac joint pain which is not too bothersome.       Mild atherosclerosis in bilateral carotid arteries demonstrated on ultrasound May 6, 2016.  Knowing that she has some deposits in her carotid arteries, thus another reason to stop smoking.     History of vitamin D deficiency as measured August 29, 2019, recheck vitamin D level today.    I suggested a maintenance dose of vitamin D 2000 units a day.     Mild hyperlipidemia, but no history of cardiovascular events, started on low-dose simvastatin October 2021, target LDL under 100.    4-  Cholesterol <=199 mg/dL 160      Triglycerides <=149 mg/dL 104     Direct Measure HDL >=50 mg/dL 45 Low      LDL Cholesterol Calculated <=129 mg/dL 94      Post menopause, satisfactory bone density scan November 2012, status post hysterectomy.       Due for screening mammogram, last one satisfactory October 7, 2019.       Satisfactory screening colonoscopy most recently performed December 2016, does not need any further screening.  Diverticulosis.     History of GI bleeding and diagnosed with a duodenal ulcer in 2016     Allergic rhinitis takes daily Zyrtec, okay to continue.       Bilateral varicose  veins with spider veins in both thighs.       Moderna COVID vaccine second shot March 15, 2021, then she recalls having had 2 boosters after that, most recently was April 2022  Current on pneumococcal vaccine    --------------------------------------------------------------------------------------------------------------------------  History of Present Illness  This 77 year old old     Follow multiple issues, Lisa is doing really well.  She is been under some stress helping her son who was in his early 50s who has had some recent traumatic medical problems including a stroke    But Lisa seems to be coping pretty well.  She has managed to lose about 5 pounds since our previous meeting of April 2022.  Blood pressure looks decent.  Her only daily prescription pill is simvastatin for cholesterol, and we check a lipid panel April 26, 2022 which showed good control with LDL less than 100.    He does not need any laboratory testing today.    Just 1 new issue to mention today    Recurring sinusitis, for which I will equip her with a Z-Reese to have on hand, which is worked for her in the past  Chronic nocturnal cough with sinus drainage, negative chest x-ray July 2019.       Wt Readings from Last 3 Encounters:   07/27/22 85.3 kg (188 lb)   04/26/22 87.1 kg (192 lb)   04/12/22 89.4 kg (197 lb)     BP Readings from Last 3 Encounters:   07/27/22 130/68   04/26/22 120/78   04/12/22 136/80     ---------------------------------------------------------------------------------------------------------------------------    Medications, Allergies, Social, and Problem List   Current Outpatient Medications   Medication Sig Dispense Refill     cetirizine (ZYRTEC) 10 MG tablet [CETIRIZINE (ZYRTEC) 10 MG TABLET] Take 10 mg by mouth daily.       desonide (DESOWEN) 0.05 % external cream APPLY TO THE AFFECTED AREA AS NEEDED 60 g 1     simvastatin (ZOCOR) 10 MG tablet TAKE 1 TABLET(10 MG) BY MOUTH AT BEDTIME 90 tablet 2     Allergies    Allergen Reactions     Cat Dander [Animal Dander] Unknown     Dog Dander [Dog Epithelium] Unknown     Social History     Tobacco Use     Smoking status: Current Some Day Smoker     Packs/day: 0.25     Smokeless tobacco: Never Used   Substance Use Topics     Alcohol use: Yes     Comment: Alcoholic Drinks/day: rare     Drug use: No     Patient Active Problem List   Diagnosis     Diverticular disease of large intestine     Class 1 obesity due to excess calories in adult     Tobacco abuse     SI (sacroiliac) joint dysfunction     Carotid atherosclerosis, bilateral-- US May 2016     Hyperlipidemia LDL goal <100     Allergic rhinitis due to pollen     Asymptomatic varicose veins of both lower extremities     Vitamin D deficiency        Reviewed, reconciled and updated       Physical Exam   General Appearance:       /68   Pulse 61   Wt 85.3 kg (188 lb)   SpO2 97%   BMI 32.27 kg/m      Lisa looks great today, lungs clear  Heart regular rate and rhythm.  No carotid bruits.     Additional Information   I spent 20 minutes on this encounter, including reviewing interval history since last visit, examining the patient, explaining and counseling the issues enumerated in the Assessment and Plan (patient given a copy), ordering indicated tests, ordering prescriptions       WENDY KELLEY MD, MD

## 2022-07-27 NOTE — PATIENT INSTRUCTIONS
Follow multiple issues, Lisa is doing really well.  She is been under some stress helping her son who was in his early 50s who has had some recent traumatic medical problems including a stroke    But Lisa seems to be coping pretty well.  She has managed to lose about 5 pounds since our previous meeting of April 2022.  Blood pressure looks decent.  Her only daily prescription pill is simvastatin for cholesterol, and we check a lipid panel April 26, 2022 which showed good control with LDL less than 100.    He does not need any laboratory testing today.    Just 1 new issue to mention today    Recurring sinusitis, for which I will equip her with a Z-Reese to have on hand, which is worked for her in the past  Chronic nocturnal cough with sinus drainage, negative chest x-ray July 2019.     Pre-syncope and falling, Spells of Lightheadedness and falling once or twice around 3 times a year for approximately the last 4 years--no recurrence of spells since our previous meeting of April 2022    I asked her to describe the spells in detail.  She says that she will usually be sitting or standing, and then feel a fullness in the head.  Then she will fall to the floor, does not lose consciousness, and then after a few seconds she is okay. She denies chest pain or shortness of breath. I would consider the possibility of vasovagal reflex presyncope, or cardiac arrhythmia.  Doubt seizure.  She does not take any blood pressure medication.  And I put the results of that heart rhythm recording    Cardiac event monitoring from 5/2/2022 to 5/31/2022 (monitored duration 23d 15h 56m).  Baseline rhythm was sinus rhythm 91bpm.    Reported heart rate range 36 to 186bpm, average 69bpm.  1 symptom triggers (other) correlated to sinus rhythm 63bpm.  19 automated recordings included single occurrence of nonsustained ventricular tachycardia (6 beats, 186bpm), isolated PVCs, intermittent first degree atrioventricular block, sinus bradycardia  "(lowest 36bpm 09:39).  No sustained tachyarrhythmias  No atrial fibrillation  There were no pauses noted.  Supraventricular and ventricular ectopic beat frequency are not reported on this monitoring modality.      Lapses of short-term memory, which do not bother her, but her  and kids have noticed  Sounds like minor lapses of forgetfulness, which she tends to laugh off  I think is probably mild age-related cognitive impairment, and I told her that some aspect of that could be considered \"normal\", but I told her that she has risk factors for accelerated cognitive decline, because she has signs of vascular disease, including ongoing cigarette smoking.  I told her the main thing is to preserve her cognitive function and memory over time are maintaining her general health as well as possible, especially cardiovascular health, and staying physically and mentally active.  I told her that the various supplements that she might see advertised on TV, such as Prevagen, do not have any evidence that they work, and they are expensive     Pain and stiffness in her upper back and shoulders, which I think is on the basis of her thoracic kyphosis and the fact that her neck is chronically forward flexed.    Saw physical therapy to work on posture and upper back muscles and neck strengthening.     Obesity with body mass and active 32.5,  Trimmed off between 5-10 pounds between April and July 2022  Losing the weight will help with her sacroiliac joint pain, will also help with her varicose veins and make her lipids better.  Fortunately, she has no history of hypertension, no diabetes either.  I asked her to focus on eating slower, controlling portion size, and identifying problem foods to curtail or eliminate, especially starchy foods, sweets, calorie dense foods.  She to me that alcohol consumption is minimal, practically 0.    Wt Readings from Last 5 Encounters:   07/27/22 85.3 kg (188 lb)   04/26/22 87.1 kg (192 lb) "   04/12/22 89.4 kg (197 lb)   10/25/21 87.1 kg (192 lb)   09/09/21 87.2 kg (192 lb 4.8 oz)     Normalized hemoglobin and hematocrit on CBC April 26, 2022     Chronic groin dermatitis, for which she gets relief from steroid cream.  Okay to continue the desonide cream for the groin.  This is probably skin irritation from friction.  Does not seem to be fungal.     Light cigarette smoker of about 10 cigarettes a week, but she needs to stop even that.       Chronic sacroiliac joint pain which is not too bothersome.       Mild atherosclerosis in bilateral carotid arteries demonstrated on ultrasound May 6, 2016.  Knowing that she has some deposits in her carotid arteries, thus another reason to stop smoking.     History of vitamin D deficiency as measured August 29, 2019, recheck vitamin D level today.    I suggested a maintenance dose of vitamin D 2000 units a day.     Mild hyperlipidemia, but no history of cardiovascular events, started on low-dose simvastatin October 2021, target LDL under 100.    4-  Cholesterol <=199 mg/dL 160      Triglycerides <=149 mg/dL 104     Direct Measure HDL >=50 mg/dL 45 Low      LDL Cholesterol Calculated <=129 mg/dL 94      Post menopause, satisfactory bone density scan November 2012, status post hysterectomy.       Due for screening mammogram, last one satisfactory October 7, 2019.       Satisfactory screening colonoscopy most recently performed December 2016, does not need any further screening.  Diverticulosis.     History of GI bleeding and diagnosed with a duodenal ulcer in 2016     Allergic rhinitis takes daily Zyrtec, okay to continue.       Bilateral varicose veins with spider veins in both thighs.       Moderna COVID vaccine second shot March 15, 2021, then she recalls having had 2 boosters after that, most recently was April 2022  Current on pneumococcal vaccine

## 2022-09-25 ENCOUNTER — HEALTH MAINTENANCE LETTER (OUTPATIENT)
Age: 77
End: 2022-09-25

## 2023-01-16 DIAGNOSIS — B35.6 DERMATOPHYTOSIS OF GROIN: ICD-10-CM

## 2023-01-18 RX ORDER — DESONIDE 0.5 MG/G
CREAM TOPICAL
Qty: 60 G | Refills: 1 | Status: SHIPPED | OUTPATIENT
Start: 2023-01-18

## 2023-01-18 NOTE — TELEPHONE ENCOUNTER
Routing refill request to provider for review/approval because:  Drug not on the [facility] refill protocol     Last Written Prescription Date:  12/22/21  Last Fill Quantity: 60 g,  # refills: 1   Last office visit provider:  7/27/22     Requested Prescriptions   Pending Prescriptions Disp Refills     desonide (DESOWEN) 0.05 % external cream 60 g 1     Sig: APPLY TO THE AFFECTED AREA AS NEEDED Strength: 0.05 %       There is no refill protocol information for this order          Narayan Clemons RN 01/18/23 11:32 AM

## 2023-02-04 ENCOUNTER — HEALTH MAINTENANCE LETTER (OUTPATIENT)
Age: 78
End: 2023-02-04

## 2023-08-15 ENCOUNTER — OFFICE VISIT (OUTPATIENT)
Dept: INTERNAL MEDICINE | Facility: CLINIC | Age: 78
End: 2023-08-15
Payer: MEDICARE

## 2023-08-15 VITALS
SYSTOLIC BLOOD PRESSURE: 122 MMHG | BODY MASS INDEX: 31.41 KG/M2 | RESPIRATION RATE: 16 BRPM | DIASTOLIC BLOOD PRESSURE: 62 MMHG | HEART RATE: 72 BPM | HEIGHT: 64 IN | WEIGHT: 184 LBS | OXYGEN SATURATION: 96 % | TEMPERATURE: 98.6 F

## 2023-08-15 DIAGNOSIS — Z13.1 SCREENING FOR DIABETES MELLITUS: ICD-10-CM

## 2023-08-15 DIAGNOSIS — Z72.0 TOBACCO ABUSE: ICD-10-CM

## 2023-08-15 DIAGNOSIS — M53.3 SACROILIAC JOINT PAIN: ICD-10-CM

## 2023-08-15 DIAGNOSIS — Z00.00 ROUTINE GENERAL MEDICAL EXAMINATION AT A HEALTH CARE FACILITY: Primary | ICD-10-CM

## 2023-08-15 DIAGNOSIS — E66.09 CLASS 1 OBESITY DUE TO EXCESS CALORIES WITHOUT SERIOUS COMORBIDITY WITH BODY MASS INDEX (BMI) OF 32.0 TO 32.9 IN ADULT: ICD-10-CM

## 2023-08-15 DIAGNOSIS — E78.5 HYPERLIPIDEMIA LDL GOAL <100: ICD-10-CM

## 2023-08-15 DIAGNOSIS — Z86.39 PERSONAL HISTORY OF OTHER ENDOCRINE, NUTRITIONAL AND METABOLIC DISEASE: ICD-10-CM

## 2023-08-15 DIAGNOSIS — E66.811 CLASS 1 OBESITY DUE TO EXCESS CALORIES WITHOUT SERIOUS COMORBIDITY WITH BODY MASS INDEX (BMI) OF 32.0 TO 32.9 IN ADULT: ICD-10-CM

## 2023-08-15 LAB
ALBUMIN SERPL BCG-MCNC: 4 G/DL (ref 3.5–5.2)
ALP SERPL-CCNC: 87 U/L (ref 35–104)
ALT SERPL W P-5'-P-CCNC: 8 U/L (ref 0–50)
ANION GAP SERPL CALCULATED.3IONS-SCNC: 11 MMOL/L (ref 7–15)
AST SERPL W P-5'-P-CCNC: 26 U/L (ref 0–45)
BILIRUB SERPL-MCNC: 0.4 MG/DL
BUN SERPL-MCNC: 19.1 MG/DL (ref 8–23)
CALCIUM SERPL-MCNC: 9.5 MG/DL (ref 8.8–10.2)
CHLORIDE SERPL-SCNC: 107 MMOL/L (ref 98–107)
CHOLEST SERPL-MCNC: 151 MG/DL
CREAT SERPL-MCNC: 0.97 MG/DL (ref 0.51–0.95)
DEPRECATED HCO3 PLAS-SCNC: 23 MMOL/L (ref 22–29)
ERYTHROCYTE [DISTWIDTH] IN BLOOD BY AUTOMATED COUNT: 13.6 % (ref 10–15)
GFR SERPL CREATININE-BSD FRML MDRD: 60 ML/MIN/1.73M2
GLUCOSE SERPL-MCNC: 93 MG/DL (ref 70–99)
HBA1C MFR BLD: 5.8 % (ref 0–5.6)
HCT VFR BLD AUTO: 42.9 % (ref 35–47)
HDLC SERPL-MCNC: 42 MG/DL
HGB BLD-MCNC: 14 G/DL (ref 11.7–15.7)
LDLC SERPL CALC-MCNC: 84 MG/DL
MCH RBC QN AUTO: 29.7 PG (ref 26.5–33)
MCHC RBC AUTO-ENTMCNC: 32.6 G/DL (ref 31.5–36.5)
MCV RBC AUTO: 91 FL (ref 78–100)
NONHDLC SERPL-MCNC: 109 MG/DL
PLATELET # BLD AUTO: 203 10E3/UL (ref 150–450)
POTASSIUM SERPL-SCNC: 4.4 MMOL/L (ref 3.4–5.3)
PROT SERPL-MCNC: 7.2 G/DL (ref 6.4–8.3)
RBC # BLD AUTO: 4.71 10E6/UL (ref 3.8–5.2)
SODIUM SERPL-SCNC: 141 MMOL/L (ref 136–145)
TRIGL SERPL-MCNC: 126 MG/DL
TSH SERPL DL<=0.005 MIU/L-ACNC: 4.09 UIU/ML (ref 0.3–4.2)
WBC # BLD AUTO: 8.6 10E3/UL (ref 4–11)

## 2023-08-15 PROCEDURE — 83036 HEMOGLOBIN GLYCOSYLATED A1C: CPT | Performed by: INTERNAL MEDICINE

## 2023-08-15 PROCEDURE — 85027 COMPLETE CBC AUTOMATED: CPT | Performed by: INTERNAL MEDICINE

## 2023-08-15 PROCEDURE — 84443 ASSAY THYROID STIM HORMONE: CPT | Performed by: INTERNAL MEDICINE

## 2023-08-15 PROCEDURE — G0439 PPPS, SUBSEQ VISIT: HCPCS | Performed by: INTERNAL MEDICINE

## 2023-08-15 PROCEDURE — 99214 OFFICE O/P EST MOD 30 MIN: CPT | Mod: 25 | Performed by: INTERNAL MEDICINE

## 2023-08-15 PROCEDURE — 80061 LIPID PANEL: CPT | Performed by: INTERNAL MEDICINE

## 2023-08-15 PROCEDURE — 80053 COMPREHEN METABOLIC PANEL: CPT | Performed by: INTERNAL MEDICINE

## 2023-08-15 PROCEDURE — 36415 COLL VENOUS BLD VENIPUNCTURE: CPT | Performed by: INTERNAL MEDICINE

## 2023-08-15 RX ORDER — SIMVASTATIN 10 MG
10 TABLET ORAL AT BEDTIME
Qty: 90 TABLET | Refills: 3 | Status: SHIPPED | OUTPATIENT
Start: 2023-08-15 | End: 2024-07-24

## 2023-08-15 ASSESSMENT — ENCOUNTER SYMPTOMS
MYALGIAS: 0
DIARRHEA: 0
ARTHRALGIAS: 0
JOINT SWELLING: 0
HEADACHES: 0
BREAST MASS: 0
SORE THROAT: 0
HEMATURIA: 0
NERVOUS/ANXIOUS: 0
EYE PAIN: 0
HEMATOCHEZIA: 0
DIZZINESS: 0
HEARTBURN: 0
CONSTIPATION: 1
PALPITATIONS: 0
PARESTHESIAS: 0
FEVER: 0
FREQUENCY: 1
DYSURIA: 0
NAUSEA: 0
COUGH: 1
SHORTNESS OF BREATH: 0
WEAKNESS: 0
CHILLS: 0
ABDOMINAL PAIN: 0

## 2023-08-15 ASSESSMENT — ACTIVITIES OF DAILY LIVING (ADL): CURRENT_FUNCTION: NO ASSISTANCE NEEDED

## 2023-08-15 NOTE — PATIENT INSTRUCTIONS
Annual preventive visit, Lisa is here with her  Jean, and both of them have been doing quite well.  They are planning to go to [facility] for the winter approximately October.    Lisa is really on minimal medication, just simvastatin for cholesterol.    She is fasting this morning so we will check her lipid panel, comprehensive metabolic panel, blood cell counts, TSH for thyroid, A1c to screen for diabetes.    I reminded Jen to get a COVID booster along with seasonal flu shot this autumn.    We will be referring her to physical therapy to give her a refresher in core muscle strengthening, to address the longstanding sacroiliac low back pain.      Chronic sacroiliac joint pain-- needs review with PT  This is been a longstanding phenomena, and Lisa says that it bothers her most when she is rising from seated to standing, much but when she is moving around it is better  We will have her do a refresher session with physical therapy so she can get in place the home exercise program to strengthen her core muscles and hip girdle    Recurring sinusitis, for which I will equip her with a Z-Reese to have on hand, which is worked for her in the past  Chronic nocturnal cough with sinus drainage, negative chest x-ray July 2019    Abscessed teeth, alveolar cysts, and required 2 root canal procedures done in May 2023, which may have had a severe bacterial infection,   In response to which her dentist gave her a prolonged course of oral penicillin.  Around that time she noticed some red papules on her legs.  Hard to know whether that represents a direct consequence of the dental issues, but important thing is it is resolved.    Pre-syncope and falling, Spells of Lightheadedness and falling once or twice around 3 times a year for approximately the last 4 years--no recurrence of spells since our previous meeting of April 2022  I asked her to describe the spells in detail.  She says that she will usually  "be sitting or standing, and then feel a fullness in the head.  Then she will fall to the floor, does not lose consciousness, and then after a few seconds she is okay. She denies chest pain or shortness of breath. I would consider the possibility of vasovagal reflex presyncope, or cardiac arrhythmia.  Doubt seizure.  She does not take any blood pressure medication.  And I put the results of that heart rhythm recording     Cardiac event monitoring from 5/2/2022 to 5/31/2022 (monitored duration 23d 15h 56m).  Baseline rhythm was sinus rhythm 91bpm.    Reported heart rate range 36 to 186bpm, average 69bpm.  1 symptom triggers (other) correlated to sinus rhythm 63bpm.  19 automated recordings included single occurrence of nonsustained ventricular tachycardia (6 beats, 186bpm), isolated PVCs, intermittent first degree atrioventricular block, sinus bradycardia (lowest 36bpm 09:39).  No sustained tachyarrhythmias  No atrial fibrillation  There were no pauses noted.  Supraventricular and ventricular ectopic beat frequency are not reported on this monitoring modality.       Lapses of short-term memory, which do not bother her, but her  and kids have noticed  Sounds like minor lapses of forgetfulness, which she tends to laugh off  I think is probably mild age-related cognitive impairment, and I told her that some aspect of that could be considered \"normal\", but I told her that she has risk factors for accelerated cognitive decline, because she has signs of vascular disease, including ongoing cigarette smoking.  I told her the main thing is to preserve her cognitive function and memory over time are maintaining her general health as well as possible, especially cardiovascular health, and staying physically and mentally active.  I told her that the various supplements that she might see advertised on TV, such as Prevagen, do not have any evidence that they work, and they are expensive     Pain and stiffness in her upper " back and shoulders, which I think is on the basis of her thoracic kyphosis and the fact that her neck is chronically forward flexed.    Saw physical therapy to work on posture and upper back muscles and neck strengthening.     Obesity with body mass and active 32  Losing the weight will help with her sacroiliac joint pain, will also help with her varicose veins and make her lipids better.  Fortunately, she has no history of hypertension, no diabetes either.  I asked her to focus on eating slower, controlling portion size, and identifying problem foods to curtail or eliminate, especially starchy foods, sweets, calorie dense foods.  She to me that alcohol consumption is minimal, practically 0.     Wt Readings from Last 5 Encounters:   08/15/23 83.5 kg (184 lb)   07/27/22 85.3 kg (188 lb)   04/26/22 87.1 kg (192 lb)   04/12/22 89.4 kg (197 lb)   10/25/21 87.1 kg (192 lb)      Chronic groin dermatitis, for which she gets relief from steroid cream.  Okay to continue the desonide cream for the groin.  This is probably skin irritation from friction.  Does not seem to be fungal.     Light cigarette smoker of about 10 cigarettes a week, but she needs to stop even that.      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  Knowing that she has some deposits in her carotid arteries, thus another reason to stop smoking.    History of vitamin D deficiency as measured August 29, 2019, recheck vitamin D level today.    I suggested a maintenance dose of vitamin D 2000 units a day.     Mild hyperlipidemia, but no history of cardiovascular events, started on low-dose simvastatin October 2021, target LDL under 100.    4-  Cholesterol <=199 mg/dL 160       Triglycerides <=149 mg/dL 104      Direct Measure HDL >=50 mg/dL 45 Low       LDL Cholesterol Calculated <=129  mg/dL 94       Post menopause, satisfactory bone density scan November 2012, status post hysterectomy.       Consider screening mammogram  BILATERAL FULL FIELD DIGITAL SCREENING MAMMOGRAM  Performed on: 10/27/21     Satisfactory screening colonoscopy most recently performed December 2016, does not need any further screening.  Diverticulosis.    History of GI bleeding and diagnosed with a duodenal ulcer in 2016     Allergic rhinitis takes daily Zyrtec, okay to continue.      Hyperesthesia left abdominal wall, which could correspond to the area where she had shingles in approximately 2010, and may have hyperesthesia from postherpetic neuralgia.  She is careful to avoid external pressure applied to the area.  I will feel any lump there, so I guess this is neuropathic pain.     Bilateral varicose veins with spider veins in both thighs.       Vaccines  Current on pneumococcal vaccine  I suggested to Lisa that she could get a COVID booster along with her seasonal flu shot this autumn, approximately September    Immunization History   Administered Date(s) Administered    COVID-19 Bivalent 18+ (Moderna) 10/09/2022    COVID-19 Monovalent 18+ (Moderna) 02/03/2021, 03/15/2021    Flu 65+ Years 09/26/2019    Flu, Unspecified 09/26/2019    Influenza (High Dose) 3 valent vaccine 11/30/2017, 11/06/2018    Influenza Vaccine 65+ (Fluzone HD) 09/23/2020, 09/09/2021    Pneumo Conj 13-V (2010&after) 05/05/2016    Pneumococcal 23 valent 09/23/2020    TDAP (Adacel,Boostrix) 11/26/2014    Td (Adult), Adsorbed 08/30/2005

## 2023-08-15 NOTE — PROGRESS NOTES
"SUBJECTIVE:   Lisa is a 78 year old who presents for Preventive Visit.      8/15/2023    10:54 AM   Additional Questions   Roomed by Astrid       Are you in the first 12 months of your Medicare coverage?  No    Healthy Habits:     In general, how would you rate your overall health?  Good    Frequency of exercise:  None    Do you usually eat at least 4 servings of fruit and vegetables a day, include whole grains    & fiber and avoid regularly eating high fat or \"junk\" foods?  Yes    Taking medications regularly:  Yes    Medication side effects:  Not applicable    Ability to successfully perform activities of daily living:  No assistance needed    Home Safety:  No safety concerns identified    Hearing Impairment:  No hearing concerns    In the past 6 months, have you been bothered by leaking of urine?  No    In general, how would you rate your overall mental or emotional health?  Good    Additional concerns today:  No      Have you ever done Advance Care Planning? (For example, a Health Directive, POLST, or a discussion with a medical provider or your loved ones about your wishes): Yes, advance care planning is on file.       Fall risk  Fallen 2 or more times in the past year?: No  Any fall with injury in the past year?: No    Cognitive Screening   1) Repeat 3 items (Leader, Season, Table)    2) Clock draw: NORMAL  3) 3 item recall: Recalls 3 objects  Results: 3 items recalled: COGNITIVE IMPAIRMENT LESS LIKELY    Mini-CogTM Copyright TIMOTHY Ortiz. Licensed by the author for use in [facility]; reprinted with permission (zane@.[facility]). All rights reserved.      Do you have sleep apnea, excessive snoring or daytime drowsiness? : no    Reviewed and updated as needed this visit by clinical staff   Tobacco  Allergies  Meds              Reviewed and updated as needed this visit by Provider                 Social History     Tobacco Use    Smoking status: Some Days     Packs/day: 0.25     Types: Cigarettes    " Smokeless tobacco: Never   Substance Use Topics    Alcohol use: Yes     Comment: Alcoholic Drinks/day: rare             8/15/2023    10:53 AM   Alcohol Use   Prescreen: >3 drinks/day or >7 drinks/week? No     Do you have a current opioid prescription? No  Do you use any other controlled substances or medications that are not prescribed by a provider? None      Current providers sharing in care for this patient include:   Patient Care Team:  Rusty Rojas MD as PCP - General  Rusty Rojas MD as Assigned PCP    The following health maintenance items are reviewed in Epic and correct as of today:  Health Maintenance   Topic Date Due    DEXA  Never done    ANNUAL REVIEW OF HM ORDERS  Never done    HEPATITIS C SCREENING  Never done    ZOSTER IMMUNIZATION (1 of 2) Never done    LUNG CANCER SCREENING  Never done    MEDICARE ANNUAL WELLNESS VISIT  09/09/2022    COVID-19 Vaccine (5 - Moderna series) 02/09/2023    INFLUENZA VACCINE (1) 09/01/2023    FALL RISK ASSESSMENT  08/15/2024    DTAP/TDAP/TD IMMUNIZATION (2 - Td or Tdap) 11/26/2024    ADVANCE CARE PLANNING  09/09/2026    LIPID  04/26/2027    PHQ-2 (once per calendar year)  Completed    Pneumococcal Vaccine: 65+ Years  Completed    IPV IMMUNIZATION  Aged Out    MENINGITIS IMMUNIZATION  Aged Out    MAMMO SCREENING  Discontinued    COLORECTAL CANCER SCREENING  Discontinued       Pertinent mammograms are reviewed under the imaging tab.    Review of Systems   Constitutional:  Negative for chills and fever.   HENT:  Negative for congestion, ear pain, hearing loss and sore throat.    Eyes:  Negative for pain and visual disturbance.   Respiratory:  Positive for cough. Negative for shortness of breath.    Cardiovascular:  Negative for chest pain, palpitations and peripheral edema.   Gastrointestinal:  Positive for constipation. Negative for abdominal pain, diarrhea, heartburn, hematochezia and nausea.   Breasts:  Negative for tenderness, breast mass and discharge.  "  Genitourinary:  Positive for frequency. Negative for dysuria, genital sores, hematuria, pelvic pain, urgency, vaginal bleeding and vaginal discharge.   Musculoskeletal:  Negative for arthralgias, joint swelling and myalgias.   Skin:  Negative for rash.   Neurological:  Negative for dizziness, weakness, headaches and paresthesias.   Psychiatric/Behavioral:  Negative for mood changes. The patient is not nervous/anxious.          OBJECTIVE:   /62 (BP Location: Right arm, Patient Position: Sitting, Cuff Size: Adult Regular)   Pulse 72   Temp 98.6  F (37  C)   Resp 16   Ht 1.626 m (5' 4\")   Wt 83.5 kg (184 lb)   SpO2 96%   BMI 31.58 kg/m   Estimated body mass index is 31.58 kg/m  as calculated from the following:    Height as of this encounter: 1.626 m (5' 4\").    Weight as of this encounter: 83.5 kg (184 lb).  Physical Exam    General: Alert, in no distress  Skin: No significant lesion seen.  Eyes/nose/throat: Eyes without scleral icterus, eye movements normal, pupils equal and reactive, oropharynx clear, ears with normal TM's  MSK: Neck with good ROM  Lymphatic: Neck without adenopathy or masses  Endocrine: Thyroid with no nodules to palpation  Pulm: Lungs clear to auscultation bilaterally  Cardiac: Heart with regular rate and rhythm, no murmur or gallop  GI: Abdomen obese, soft, nontender. No palpable enlargement of liver or spleen  MSK: Extremities no tenderness or edema  + Varicose veins of the left lower extremity, no skin breakdown  Neuro: Moves all extremities, without focal weakness  Psych: Alert, normal mental status. Normal affect and speech      ASSESSMENT / PLAN:       Annual preventive visit, Lisa is here with her  Jean, and both of them have been doing quite well.  They are planning to go to [facility] for the winter approximately October.    Lisa is really on minimal medication, just simvastatin for cholesterol.    She is fasting this morning so we will check her " lipid panel, comprehensive metabolic panel, blood cell counts, TSH for thyroid, A1c to screen for diabetes.    I reminded Jen to get a COVID booster along with seasonal flu shot this autumn.    We will be referring her to physical therapy to give her a refresher in core muscle strengthening, to address the longstanding sacroiliac low back pain.      Chronic sacroiliac joint pain-- needs review with PT  This is been a longstanding phenomena, and Lisa says that it bothers her most when she is rising from seated to standing, much but when she is moving around it is better  We will have her do a refresher session with physical therapy so she can get in place the home exercise program to strengthen her core muscles and hip girdle    Recurring sinusitis, for which I will equip her with a Z-Reese to have on hand, which is worked for her in the past  Chronic nocturnal cough with sinus drainage, negative chest x-ray July 2019    Abscessed teeth, alveolar cysts, and required 2 root canal procedures done in May 2023, which may have had a severe bacterial infection,   In response to which her dentist gave her a prolonged course of oral penicillin.  Around that time she noticed some red papules on her legs.  Hard to know whether that represents a direct consequence of the dental issues, but important thing is it is resolved.    Pre-syncope and falling, Spells of Lightheadedness and falling once or twice around 3 times a year for approximately the last 4 years--no recurrence of spells since our previous meeting of April 2022  I asked her to describe the spells in detail.  She says that she will usually be sitting or standing, and then feel a fullness in the head.  Then she will fall to the floor, does not lose consciousness, and then after a few seconds she is okay. She denies chest pain or shortness of breath. I would consider the possibility of vasovagal reflex presyncope, or cardiac arrhythmia.  Doubt seizure.  She does  "not take any blood pressure medication.  And I put the results of that heart rhythm recording     Cardiac event monitoring from 5/2/2022 to 5/31/2022 (monitored duration 23d 15h 56m).  Baseline rhythm was sinus rhythm 91bpm.    Reported heart rate range 36 to 186bpm, average 69bpm.  1 symptom triggers (other) correlated to sinus rhythm 63bpm.  19 automated recordings included single occurrence of nonsustained ventricular tachycardia (6 beats, 186bpm), isolated PVCs, intermittent first degree atrioventricular block, sinus bradycardia (lowest 36bpm 09:39).  No sustained tachyarrhythmias  No atrial fibrillation  There were no pauses noted.  Supraventricular and ventricular ectopic beat frequency are not reported on this monitoring modality.       Lapses of short-term memory, which do not bother her, but her  and kids have noticed  Sounds like minor lapses of forgetfulness, which she tends to laugh off  I think is probably mild age-related cognitive impairment, and I told her that some aspect of that could be considered \"normal\", but I told her that she has risk factors for accelerated cognitive decline, because she has signs of vascular disease, including ongoing cigarette smoking.  I told her the main thing is to preserve her cognitive function and memory over time are maintaining her general health as well as possible, especially cardiovascular health, and staying physically and mentally active.  I told her that the various supplements that she might see advertised on TV, such as Prevagen, do not have any evidence that they work, and they are expensive     Pain and stiffness in her upper back and shoulders, which I think is on the basis of her thoracic kyphosis and the fact that her neck is chronically forward flexed.    Saw physical therapy to work on posture and upper back muscles and neck strengthening.     Obesity with body mass and active 32  Losing the weight will help with her sacroiliac joint pain, " will also help with her varicose veins and make her lipids better.  Fortunately, she has no history of hypertension, no diabetes either.  I asked her to focus on eating slower, controlling portion size, and identifying problem foods to curtail or eliminate, especially starchy foods, sweets, calorie dense foods.  She to me that alcohol consumption is minimal, practically 0.     Wt Readings from Last 5 Encounters:   08/15/23 83.5 kg (184 lb)   07/27/22 85.3 kg (188 lb)   04/26/22 87.1 kg (192 lb)   04/12/22 89.4 kg (197 lb)   10/25/21 87.1 kg (192 lb)      Chronic groin dermatitis, for which she gets relief from steroid cream.  Okay to continue the desonide cream for the groin.  This is probably skin irritation from friction.  Does not seem to be fungal.     Light cigarette smoker of about 10 cigarettes a week, but she needs to stop even that.      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  Knowing that she has some deposits in her carotid arteries, thus another reason to stop smoking.    History of vitamin D deficiency as measured August 29, 2019, recheck vitamin D level today.    I suggested a maintenance dose of vitamin D 2000 units a day.     Mild hyperlipidemia, but no history of cardiovascular events, started on low-dose simvastatin October 2021, target LDL under 100.    4-  Cholesterol <=199 mg/dL 160       Triglycerides <=149 mg/dL 104      Direct Measure HDL >=50 mg/dL 45 Low       LDL Cholesterol Calculated <=129 mg/dL 94       Post menopause, satisfactory bone density scan November 2012, status post hysterectomy.       Consider screening mammogram  BILATERAL FULL FIELD DIGITAL SCREENING MAMMOGRAM  Performed on: 10/27/21     Satisfactory screening colonoscopy most recently performed December 2016, does not need any further  "screening.  Diverticulosis.    History of GI bleeding and diagnosed with a duodenal ulcer in 2016     Allergic rhinitis takes daily Zyrtec, okay to continue.      Hyperesthesia left abdominal wall, which could correspond to the area where she had shingles in approximately 2010, and may have hyperesthesia from postherpetic neuralgia.  She is careful to avoid external pressure applied to the area.  I will feel any lump there, so I guess this is neuropathic pain.     Bilateral varicose veins with spider veins in both thighs.       Vaccines  Current on pneumococcal vaccine  I suggested to Lisa that she could get a COVID booster along with her seasonal flu shot this autumn, approximately September    Immunization History   Administered Date(s) Administered    COVID-19 Bivalent 18+ (Moderna) 10/09/2022    COVID-19 Monovalent 18+ (Moderna) 02/03/2021, 03/15/2021    Flu 65+ Years 09/26/2019    Flu, Unspecified 09/26/2019    Influenza (High Dose) 3 valent vaccine 11/30/2017, 11/06/2018    Influenza Vaccine 65+ (Fluzone HD) 09/23/2020, 09/09/2021    Pneumo Conj 13-V (2010&after) 05/05/2016    Pneumococcal 23 valent 09/23/2020    TDAP (Adacel,Boostrix) 11/26/2014    Td (Adult), Adsorbed 08/30/2005         BMI:   Estimated body mass index is 31.58 kg/m  as calculated from the following:    Height as of this encounter: 1.626 m (5' 4\").    Weight as of this encounter: 83.5 kg (184 lb).   Weight management plan: Discussed healthy diet and exercise guidelines    She reports that she has been smoking. She has been smoking an average of .25 packs per day. She has never used smokeless tobacco.  Nicotine/Tobacco Cessation Plan:   Information offered: Patient not interested at this time      Appropriate preventive services were discussed with this patient, including applicable screening as appropriate for cardiovascular disease, diabetes, osteopenia/osteoporosis, and glaucoma.  As appropriate for age/gender, discussed screening for " colorectal cancer, prostate cancer, breast cancer, and cervical cancer. Checklist reviewing preventive services available has been given to the patient.    Reviewed patients plan of care and provided an AVS. The Basic Care Plan (routine screening as documented in Health Maintenance) for Lisa meets the Care Plan requirement. This Care Plan has been established and reviewed with the Patient.    WENDY KELLEY MD  [facility]    Identified Health Risks:  I have reviewed Opioid Use Disorder and Substance Use Disorder risk factors and made any needed referrals.

## 2024-05-01 ENCOUNTER — OFFICE VISIT (OUTPATIENT)
Dept: INTERNAL MEDICINE | Facility: CLINIC | Age: 79
End: 2024-05-01
Payer: MEDICARE

## 2024-05-01 VITALS
TEMPERATURE: 98.2 F | SYSTOLIC BLOOD PRESSURE: 126 MMHG | OXYGEN SATURATION: 95 % | HEIGHT: 64 IN | WEIGHT: 184 LBS | BODY MASS INDEX: 31.41 KG/M2 | HEART RATE: 74 BPM | RESPIRATION RATE: 16 BRPM | DIASTOLIC BLOOD PRESSURE: 74 MMHG

## 2024-05-01 DIAGNOSIS — E66.811 CLASS 1 OBESITY DUE TO EXCESS CALORIES WITHOUT SERIOUS COMORBIDITY WITH BODY MASS INDEX (BMI) OF 31.0 TO 31.9 IN ADULT: ICD-10-CM

## 2024-05-01 DIAGNOSIS — Z79.899 OTHER LONG TERM (CURRENT) DRUG THERAPY: ICD-10-CM

## 2024-05-01 DIAGNOSIS — R73.03 PREDIABETES: ICD-10-CM

## 2024-05-01 DIAGNOSIS — E66.09 CLASS 1 OBESITY DUE TO EXCESS CALORIES WITHOUT SERIOUS COMORBIDITY WITH BODY MASS INDEX (BMI) OF 31.0 TO 31.9 IN ADULT: ICD-10-CM

## 2024-05-01 DIAGNOSIS — R41.3 MEMORY IMPAIRMENT OF GRADUAL ONSET: Primary | ICD-10-CM

## 2024-05-01 LAB — HBA1C MFR BLD: 5.6 % (ref 0–5.6)

## 2024-05-01 PROCEDURE — G2211 COMPLEX E/M VISIT ADD ON: HCPCS | Performed by: INTERNAL MEDICINE

## 2024-05-01 PROCEDURE — 83036 HEMOGLOBIN GLYCOSYLATED A1C: CPT | Performed by: INTERNAL MEDICINE

## 2024-05-01 PROCEDURE — 82607 VITAMIN B-12: CPT | Performed by: INTERNAL MEDICINE

## 2024-05-01 PROCEDURE — 82306 VITAMIN D 25 HYDROXY: CPT | Performed by: INTERNAL MEDICINE

## 2024-05-01 PROCEDURE — 84443 ASSAY THYROID STIM HORMONE: CPT | Performed by: INTERNAL MEDICINE

## 2024-05-01 PROCEDURE — 36415 COLL VENOUS BLD VENIPUNCTURE: CPT | Performed by: INTERNAL MEDICINE

## 2024-05-01 PROCEDURE — 99214 OFFICE O/P EST MOD 30 MIN: CPT | Performed by: INTERNAL MEDICINE

## 2024-05-01 RX ORDER — DONEPEZIL HYDROCHLORIDE 5 MG/1
5 TABLET, FILM COATED ORAL AT BEDTIME
Qty: 30 TABLET | Refills: 11 | Status: SHIPPED | OUTPATIENT
Start: 2024-05-01

## 2024-05-01 NOTE — PATIENT INSTRUCTIONS
Follow-up multiple issues.    The main thing we talked about today is    Gradually progressive problems with short-term memory and more complex cognitive tasks--I believe Lisa may have early Alzheimer's disease, and I do not see any signs of other dementing illnesses such as Lewy body.    I told Cayetano that the most important thing is to make sure her general health is as good as it can be, including stopping smoking.  I would like to run a few metabolic tests to make sure her thyroid is okay, also check vitamin B12 levels.    What particularly caused her  to become alarmed was Lisa had an incident of getting lost while driving in 2022, although more recently in 2023 in 2024 she has been driving without problem.  There have been no personality changes, no motor function disturbances, no speech difficulties, and she does not consume alcohol.  She does smoke a few cigarettes a day and she knows to get rid of that.    I do not think brain imaging would be particularly helpful right now.    I suggested that Lisa could try a small dose of donepezil 5 mg a day, to try to boost acetylcholine levels in the brain which might reduce some modest symptomatic benefit.  I told Lisa about potential side effect of donepezil which is diarrhea, because of acetylcholine receptors in the gut  If donepezil 5 mg tolerated, dose could be escalated to 10 mg a day, although at the higher dose there is more risk of GI side effects    If she wants to try Prevagen, that is okay.    I encouraged her to stay as active as she can physically, mentally, and socially.    Obesity with body mass and active 31.5, considering that she had a slightly elevated hemoglobin A1c indicating prediabetes, I think Lisa would be a good candidate to try metformin, initially 500 mg twice a day taken with breakfast and supper, prescription sent May 1, 2024.    I told Lisa that metformin has beneficial metabolic effects, and some effects  on the stomach that are similar to the GLP-1 such as Ozempic and Wegovy.  Metformin is generally well-tolerated.  Potential side effect of loose stools, sometimes diarrhea, and those things tend to get better by sticking with the drug for a few additional weeks.     Wt Readings from Last 5 Encounters:   05/01/24 83.5 kg (184 lb)   08/15/23 83.5 kg (184 lb)   07/27/22 85.3 kg (188 lb)   04/26/22 87.1 kg (192 lb)   04/12/22 89.4 kg (197 lb)     Numbness and tingling in her toes, at this think this could represent a bit of small fiber peripheral neuropathy, I do not think has any relation to central nervous to problem such as memory.    Will check vitamin B12 levels and also updated A1c, although I do not think this is because of blood sugar.    I encouraged her to take a multivitamin a day which contains B complex multivitamins that are important for healthy nerve tissue    Chronic sacroiliac joint pain-- needs review with PT  This is been a longstanding phenomena, and Lisa says that it bothers her most when she is rising from seated to standing, much but when she is moving around it is better  We will have her do a refresher session with physical therapy so she can get in place the home exercise program to strengthen her core muscles and hip girdle     Recurring sinusitis, for which I will equip her with a Z-Reese to have on hand, which is worked for her in the past  Chronic nocturnal cough with sinus drainage, negative chest x-ray July 2019     Abscessed teeth, alveolar cysts, and required 2 root canal procedures done in May 2023, which may have had a severe bacterial infection,   In response to which her dentist gave her a prolonged course of oral penicillin.  Around that time she noticed some red papules on her legs.  Hard to know whether that represents a direct consequence of the dental issues, but important thing is it is resolved.     History Pre-syncope and falling, Spells of Lightheadedness and falling  once or twice around 3 times a year for approximately the last 4 years--no recurrence of spells since our previous meeting of April 2022  I asked her to describe the spells in detail.  She says that she will usually be sitting or standing, and then feel a fullness in the head.  Then she will fall to the floor, does not lose consciousness, and then after a few seconds she is okay. She denies chest pain or shortness of breath. I would consider the possibility of vasovagal reflex presyncope, or cardiac arrhythmia.  Doubt seizure.  She does not take any blood pressure medication.  And I put the results of that heart rhythm recording     Cardiac event monitoring from 5/2/2022 to 5/31/2022 (monitored duration 23d 15h 56m).  Baseline rhythm was sinus rhythm 91bpm.    Reported heart rate range 36 to 186bpm, average 69bpm.  1 symptom triggers (other) correlated to sinus rhythm 63bpm.  19 automated recordings included single occurrence of nonsustained ventricular tachycardia (6 beats, 186bpm), isolated PVCs, intermittent first degree atrioventricular block, sinus bradycardia (lowest 36bpm 09:39).  No sustained tachyarrhythmias  No atrial fibrillation  There were no pauses noted.  Supraventricular and ventricular ectopic beat frequency are not reported on this monitoring modality.        Pain and stiffness in her upper back and shoulders, which I think is on the basis of her thoracic kyphosis and the fact that her neck is chronically forward flexed.    Saw physical therapy to work on posture and upper back muscles and neck strengthening.     Chronic groin dermatitis, for which she gets relief from steroid cream.  Okay to continue the desonide cream for the groin.  This is probably skin irritation from friction.  Does not seem to be fungal.     Light cigarette smoker of about 10 cigarettes a week, but she needs to stop even that.       Stress helping her son who was in his early 50s who has had some recent traumatic medical  problems including a stroke      Mild atherosclerosis in bilateral carotid arteries demonstrated on ultrasound May 6, 2016.  Knowing that she has some deposits in her carotid arteries, thus another reason to stop smoking.     History of vitamin D deficiency as measured August 29, 2019  I suggested a maintenance dose of vitamin D 2000 units a day.     Mild hyperlipidemia, but no history of cardiovascular events, started on low-dose simvastatin October 2021, target LDL under 100.    4-  Cholesterol <=199 mg/dL 160       Triglycerides <=149 mg/dL 104      Direct Measure HDL >=50 mg/dL 45 Low       LDL Cholesterol Calculated <=129 mg/dL 94       Post menopause, satisfactory bone density scan November 2012, status post hysterectomy.       Consider screening mammogram  BILATERAL FULL FIELD DIGITAL SCREENING MAMMOGRAM  Performed on: 10/27/21     Satisfactory screening colonoscopy most recently performed December 2016, does not need any further screening.  Diverticulosis.     History of GI bleeding and diagnosed with a duodenal ulcer in 2016     Allergic rhinitis takes daily Zyrtec, okay to continue.       Hyperesthesia left abdominal wall, which could correspond to the area where she had shingles in approximately 2010, and may have hyperesthesia from postherpetic neuralgia.  She is careful to avoid external pressure applied to the area.  I will feel any lump there, so I guess this is neuropathic pain.     Bilateral varicose veins with spider veins in both thighs.       Vaccines  Current on pneumococcal vaccine

## 2024-05-01 NOTE — PROGRESS NOTES
Office Visit - Follow Up   Lisa Jolley   78 year old female    Date of Visit: 5/1/2024    Chief Complaint   Patient presents with    Memory Loss        -------------------------------------------------------------------------------------------------------------------------  Assessment and Plan    Follow-up multiple issues.    The main thing we talked about today is    Gradually progressive problems with short-term memory and more complex cognitive tasks--I believe Lisa may have early Alzheimer's disease, and I do not see any signs of other dementing illnesses such as Lewy body.    I told Cayetano that the most important thing is to make sure her general health is as good as it can be, including stopping smoking.  I would like to run a few metabolic tests to make sure her thyroid is okay, also check vitamin B12 levels.    What particularly caused her  to become alarmed was Lisa had an incident of getting lost while driving in 2022, although more recently in 2023 in 2024 she has been driving without problem.  There have been no personality changes, no motor function disturbances, no speech difficulties, and she does not consume alcohol.  She does smoke a few cigarettes a day and she knows to get rid of that.    I do not think brain imaging would be particularly helpful right now.    I suggested that Lisa could try a small dose of donepezil 5 mg a day, to try to boost acetylcholine levels in the brain which might reduce some modest symptomatic benefit.  I told Lisa about potential side effect of donepezil which is diarrhea, because of acetylcholine receptors in the gut  If donepezil 5 mg tolerated, dose could be escalated to 10 mg a day, although at the higher dose there is more risk of GI side effects    If she wants to try Prevagen, that is okay.    I encouraged her to stay as active as she can physically, mentally, and socially.    Obesity with body mass and active 31.5, considering that  she had a slightly elevated hemoglobin A1c indicating prediabetes, I think Lisa would be a good candidate to try metformin, initially 500 mg twice a day taken with breakfast and supper, prescription sent May 1, 2024.    I told Lisa that metformin has beneficial metabolic effects, and some effects on the stomach that are similar to the GLP-1 such as Ozempic and Wegovy.  Metformin is generally well-tolerated.  Potential side effect of loose stools, sometimes diarrhea, and those things tend to get better by sticking with the drug for a few additional weeks.     Wt Readings from Last 5 Encounters:   05/01/24 83.5 kg (184 lb)   08/15/23 83.5 kg (184 lb)   07/27/22 85.3 kg (188 lb)   04/26/22 87.1 kg (192 lb)   04/12/22 89.4 kg (197 lb)     Numbness and tingling in her toes, at this think this could represent a bit of small fiber peripheral neuropathy, I do not think has any relation to central nervous to problem such as memory.    Will check vitamin B12 levels and also updated A1c, although I do not think this is because of blood sugar.    I encouraged her to take a multivitamin a day which contains B complex multivitamins that are important for healthy nerve tissue    Chronic sacroiliac joint pain-- needs review with PT  This is been a longstanding phenomena, and Lisa says that it bothers her most when she is rising from seated to standing, much but when she is moving around it is better  We will have her do a refresher session with physical therapy so she can get in place the home exercise program to strengthen her core muscles and hip girdle     Recurring sinusitis, for which I will equip her with a Z-Reese to have on hand, which is worked for her in the past  Chronic nocturnal cough with sinus drainage, negative chest x-ray July 2019     Abscessed teeth, alveolar cysts, and required 2 root canal procedures done in May 2023, which may have had a severe bacterial infection,   In response to which her dentist  gave her a prolonged course of oral penicillin.  Around that time she noticed some red papules on her legs.  Hard to know whether that represents a direct consequence of the dental issues, but important thing is it is resolved.     History Pre-syncope and falling, Spells of Lightheadedness and falling once or twice around 3 times a year for approximately the last 4 years--no recurrence of spells since our previous meeting of April 2022  I asked her to describe the spells in detail.  She says that she will usually be sitting or standing, and then feel a fullness in the head.  Then she will fall to the floor, does not lose consciousness, and then after a few seconds she is okay. She denies chest pain or shortness of breath. I would consider the possibility of vasovagal reflex presyncope, or cardiac arrhythmia.  Doubt seizure.  She does not take any blood pressure medication.  And I put the results of that heart rhythm recording     Cardiac event monitoring from 5/2/2022 to 5/31/2022 (monitored duration 23d 15h 56m).  Baseline rhythm was sinus rhythm 91bpm.    Reported heart rate range 36 to 186bpm, average 69bpm.  1 symptom triggers (other) correlated to sinus rhythm 63bpm.  19 automated recordings included single occurrence of nonsustained ventricular tachycardia (6 beats, 186bpm), isolated PVCs, intermittent first degree atrioventricular block, sinus bradycardia (lowest 36bpm 09:39).  No sustained tachyarrhythmias  No atrial fibrillation  There were no pauses noted.  Supraventricular and ventricular ectopic beat frequency are not reported on this monitoring modality.        Pain and stiffness in her upper back and shoulders, which I think is on the basis of her thoracic kyphosis and the fact that her neck is chronically forward flexed.    Saw physical therapy to work on posture and upper back muscles and neck strengthening.     Chronic groin dermatitis, for which she gets relief from steroid cream.  Okay to continue  the desonide cream for the groin.  This is probably skin irritation from friction.  Does not seem to be fungal.     Light cigarette smoker of about 10 cigarettes a week, but she needs to stop even that.       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  Knowing that she has some deposits in her carotid arteries, thus another reason to stop smoking.     History of vitamin D deficiency as measured August 29, 2019  I suggested a maintenance dose of vitamin D 2000 units a day.     Mild hyperlipidemia, but no history of cardiovascular events, started on low-dose simvastatin October 2021, target LDL under 100.    4-  Cholesterol <=199 mg/dL 160       Triglycerides <=149 mg/dL 104      Direct Measure HDL >=50 mg/dL 45 Low       LDL Cholesterol Calculated <=129 mg/dL 94       Post menopause, satisfactory bone density scan November 2012, status post hysterectomy.       Consider screening mammogram  BILATERAL FULL FIELD DIGITAL SCREENING MAMMOGRAM  Performed on: 10/27/21     Satisfactory screening colonoscopy most recently performed December 2016, does not need any further screening.  Diverticulosis.     History of GI bleeding and diagnosed with a duodenal ulcer in 2016     Allergic rhinitis takes daily Zyrtec, okay to continue.       Hyperesthesia left abdominal wall, which could correspond to the area where she had shingles in approximately 2010, and may have hyperesthesia from postherpetic neuralgia.  She is careful to avoid external pressure applied to the area.  I will feel any lump there, so I guess this is neuropathic pain.     Bilateral varicose veins with spider veins in both thighs.       Vaccines  Current on pneumococcal  vaccine    --------------------------------------------------------------------------------------------------------------------------  History of Present Illness  This 78 year old old       Reason for visit:  Memory loss     She eats 4 or more servings of fruits and vegetables daily.She consumes 0 sweetened beverage(s) daily.She exercises with enough effort to increase her heart rate 9 or less minutes per day.  She exercises with enough effort to increase her heart rate 3 or less days per week.   She is taking medications regularly.      Wt Readings from Last 3 Encounters:   05/01/24 83.5 kg (184 lb)   08/15/23 83.5 kg (184 lb)   07/27/22 85.3 kg (188 lb)     BP Readings from Last 3 Encounters:   05/01/24 126/74   08/15/23 122/62   07/27/22 130/68       ---------------------------------------------------------------------------------------------------------------------------    Medications, Allergies, Social, and Problem List     Current Outpatient Medications   Medication Sig Dispense Refill    cetirizine (ZYRTEC) 10 MG tablet [CETIRIZINE (ZYRTEC) 10 MG TABLET] Take 10 mg by mouth daily.      desonide (DESOWEN) 0.05 % external cream APPLY TO THE AFFECTED AREA AS NEEDED Strength: 0.05 % 60 g 1    simvastatin (ZOCOR) 10 MG tablet Take 1 tablet (10 mg) by mouth At Bedtime 90 tablet 3     Allergies   Allergen Reactions    Cat Dander [Animal Dander] Unknown    Dog Dander [Dog Epithelium (Canis Lupus Familiaris)] Unknown     Social History     Tobacco Use    Smoking status: Some Days     Current packs/day: 0.25     Types: Cigarettes     Passive exposure: Current    Smokeless tobacco: Never   Substance Use Topics    Alcohol use: Yes     Comment: Alcoholic Drinks/day: rare    Drug use: No     Patient Active Problem List   Diagnosis    Diverticular disease of large intestine    Class 1 obesity due to excess calories in adult    Tobacco abuse    SI (sacroiliac) joint dysfunction    Carotid atherosclerosis, bilateral-- US May  "2016    Hyperlipidemia LDL goal <100    Allergic rhinitis due to pollen    Asymptomatic varicose veins of both lower extremities    Vitamin D deficiency        Reviewed, reconciled and updated       Physical Exam   General Appearance:       /74 (BP Location: Right arm, Patient Position: Sitting, Cuff Size: Adult Regular)   Pulse 74   Temp 98.2  F (36.8  C)   Resp 16   Ht 1.626 m (5' 4\")   Wt 83.5 kg (184 lb)   LMP  (LMP Unknown)   SpO2 95%   BMI 31.58 kg/m      Lisa appeared well today, and cognitively seems quite intact, answering my questions appropriately and seemed to comprehend fully the conversation     Additional Information   I spent 30 minutes on this encounter, including reviewing interval history since last visit, examining the patient, explaining and counseling the issues enumerated in the Assessment and Plan (patient given a copy), ordering indicated tests, ordering prescriptions    The longitudinal plan of care for the diagnosis(es)/condition(s) as documented were addressed during this visit. Due to the added complexity in care, I will continue to support Lisa in the subsequent management and with ongoing continuity of care.       WENDY KELLEY MD, MD        Signed Electronically by: WENDY KELLEY MD    "

## 2024-05-02 LAB
TSH SERPL DL<=0.005 MIU/L-ACNC: 2.71 UIU/ML (ref 0.3–4.2)
VIT B12 SERPL-MCNC: 425 PG/ML (ref 232–1245)
VIT D+METAB SERPL-MCNC: 37 NG/ML (ref 20–50)

## 2024-05-28 ENCOUNTER — OFFICE VISIT (OUTPATIENT)
Dept: FAMILY MEDICINE | Facility: CLINIC | Age: 79
End: 2024-05-28
Payer: MEDICARE

## 2024-05-28 VITALS
SYSTOLIC BLOOD PRESSURE: 126 MMHG | BODY MASS INDEX: 31.58 KG/M2 | RESPIRATION RATE: 16 BRPM | HEART RATE: 52 BPM | TEMPERATURE: 97.7 F | WEIGHT: 184 LBS | OXYGEN SATURATION: 97 % | DIASTOLIC BLOOD PRESSURE: 76 MMHG

## 2024-05-28 DIAGNOSIS — R10.9 LEFT SIDED ABDOMINAL PAIN: Primary | ICD-10-CM

## 2024-05-28 DIAGNOSIS — K59.01 SLOW TRANSIT CONSTIPATION: ICD-10-CM

## 2024-05-28 PROCEDURE — 99213 OFFICE O/P EST LOW 20 MIN: CPT | Performed by: FAMILY MEDICINE

## 2024-05-28 NOTE — PATIENT INSTRUCTIONS
Try MiraLAX  1 capful in water or juice (ordinarily once daily for bowels) but take 2-3 times today.    Also try Fleet Enema today.    If pain continues, go the the [facility].

## 2024-05-28 NOTE — PROGRESS NOTES
(R10.9) Left sided abdominal pain  (primary encounter diagnosis)  Comment:   Plan:     (K59.01) Slow transit constipation  Comment:   Plan:       Discussion:  History suggests constipation and the bulging area she felt earlier today is suggestive.  I advised attempts at relieving constipation.  See instructions.  For persistent pain, patient and  advised that she should go to the ER.      CHIEF COMPLAINT    Left-sided abdominal discomfort.      HISTORY    Patient is here with her .    She tells us that she had a feeling of a bulge on the left side of the abdomen this morning which is not so noticeable at this time.  She does complain of left-sided abdominal discomfort, kind of below the ribs.    She is not having fever, vomiting.  She does have a history of constipation.  Last bowel movement was today and previous 1 was 4 days ago.  She is not on a laxative.      REVIEW OF SYSTEMS    No fever.  No cough or SOB.  No chest pain.  No urinary difficulty.  No rash.      EXAM  /76   Pulse 52   Temp 97.7  F (36.5  C)   Resp 16   Wt 83.5 kg (184 lb)   LMP  (LMP Unknown)   SpO2 97%   BMI 31.58 kg/m      Alert, NAD.  HEENT unremarkable.  No scleral icterus.  Nonlabored breathing.  Abdomen nondistended, no obvious mass palpable, minimally tender LUQ without guarding.  Skin without jaundice or rash.

## 2024-07-16 ENCOUNTER — PATIENT OUTREACH (OUTPATIENT)
Dept: CARE COORDINATION | Facility: CLINIC | Age: 79
End: 2024-07-16
Payer: MEDICARE

## 2024-07-24 DIAGNOSIS — E78.5 HYPERLIPIDEMIA LDL GOAL <100: ICD-10-CM

## 2024-07-24 RX ORDER — SIMVASTATIN 10 MG
10 TABLET ORAL AT BEDTIME
Qty: 90 TABLET | Refills: 2 | Status: SHIPPED | OUTPATIENT
Start: 2024-07-24

## 2024-07-30 ENCOUNTER — PATIENT OUTREACH (OUTPATIENT)
Dept: CARE COORDINATION | Facility: CLINIC | Age: 79
End: 2024-07-30
Payer: MEDICARE

## 2024-09-28 ENCOUNTER — HEALTH MAINTENANCE LETTER (OUTPATIENT)
Age: 79
End: 2024-09-28

## 2024-10-11 ENCOUNTER — OFFICE VISIT (OUTPATIENT)
Dept: INTERNAL MEDICINE | Facility: CLINIC | Age: 79
End: 2024-10-11
Payer: MEDICARE

## 2024-10-11 VITALS
HEART RATE: 72 BPM | DIASTOLIC BLOOD PRESSURE: 56 MMHG | TEMPERATURE: 97.4 F | BODY MASS INDEX: 31.58 KG/M2 | OXYGEN SATURATION: 97 % | HEIGHT: 64 IN | SYSTOLIC BLOOD PRESSURE: 112 MMHG | RESPIRATION RATE: 16 BRPM | WEIGHT: 185 LBS

## 2024-10-11 DIAGNOSIS — R82.81 PYURIA: ICD-10-CM

## 2024-10-11 DIAGNOSIS — E78.5 HYPERLIPIDEMIA LDL GOAL <100: ICD-10-CM
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
## 2024-10-11 DIAGNOSIS — E55.9 VITAMIN D DEFICIENCY: ICD-10-CM

## 2024-10-11 DIAGNOSIS — N39.0 URINARY TRACT INFECTION WITHOUT HEMATURIA, SITE UNSPECIFIED: ICD-10-CM

## 2024-10-11 DIAGNOSIS — Z29.89 NEED FOR MALARIA PROPHYLAXIS: ICD-10-CM

## 2024-10-11 LAB
ALBUMIN UR-MCNC: 30 MG/DL
APPEARANCE UR: CLEAR
BACTERIA #/AREA URNS HPF: ABNORMAL /HPF
BILIRUB UR QL STRIP: ABNORMAL
COLOR UR AUTO: YELLOW
GLUCOSE UR STRIP-MCNC: NEGATIVE MG/DL
HGB UR QL STRIP: NEGATIVE
HYALINE CASTS #/AREA URNS LPF: ABNORMAL /LPF
KETONES UR STRIP-MCNC: NEGATIVE MG/DL
LEUKOCYTE ESTERASE UR QL STRIP: ABNORMAL
NITRATE UR QL: NEGATIVE
PH UR STRIP: 5 [PH] (ref 5–8)
RBC #/AREA URNS AUTO: ABNORMAL /HPF
SP GR UR STRIP: 1.02 (ref 1–1.03)
SQUAMOUS #/AREA URNS AUTO: ABNORMAL /LPF
UROBILINOGEN UR STRIP-ACNC: 0.2 E.U./DL
WBC #/AREA URNS AUTO: ABNORMAL /HPF

## 2024-10-11 PROCEDURE — 91320 SARSCV2 VAC 30MCG TRS-SUC IM: CPT | Performed by: INTERNAL MEDICINE

## 2024-10-11 PROCEDURE — 87086 URINE CULTURE/COLONY COUNT: CPT | Performed by: INTERNAL MEDICINE

## 2024-10-11 PROCEDURE — 87088 URINE BACTERIA CULTURE: CPT | Performed by: INTERNAL MEDICINE

## 2024-10-11 PROCEDURE — 90662 IIV NO PRSV INCREASED AG IM: CPT | Performed by: INTERNAL MEDICINE

## 2024-10-11 PROCEDURE — 99214 OFFICE O/P EST MOD 30 MIN: CPT | Mod: 25 | Performed by: INTERNAL MEDICINE

## 2024-10-11 PROCEDURE — 90480 ADMN SARSCOV2 VAC 1/ONLY CMP: CPT | Performed by: INTERNAL MEDICINE

## 2024-10-11 PROCEDURE — G0008 ADMIN INFLUENZA VIRUS VAC: HCPCS | Performed by: INTERNAL MEDICINE

## 2024-10-11 PROCEDURE — 81001 URINALYSIS AUTO W/SCOPE: CPT | Performed by: INTERNAL MEDICINE

## 2024-10-11 RX ORDER — ATOVAQUONE AND PROGUANIL HYDROCHLORIDE 250; 100 MG/1; MG/1
1 TABLET, FILM COATED ORAL DAILY
Qty: 60 TABLET | Refills: 0 | Status: SHIPPED | OUTPATIENT
Start: 2024-10-11

## 2024-10-11 RX ORDER — CEPHALEXIN 500 MG/1
500 CAPSULE ORAL 3 TIMES DAILY
Qty: 15 CAPSULE | Refills: 0 | Status: SHIPPED | OUTPATIENT
Start: 2024-10-11 | End: 2024-10-16
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
## 2024-10-12 LAB — BACTERIA UR CULT: ABNORMAL

## 2025-02-11 ENCOUNTER — PATIENT OUTREACH (OUTPATIENT)
Dept: CARE COORDINATION | Facility: CLINIC | Age: 80
End: 2025-02-11
Payer: MEDICARE

## 2025-02-18 ENCOUNTER — TELEPHONE (OUTPATIENT)
Dept: INTERNAL MEDICINE | Facility: CLINIC | Age: 80
End: 2025-02-18
Payer: MEDICARE

## 2025-02-18 NOTE — LETTER
February 18, 2025      Lisa Vegasarty  [address]   [facility] [id number]      Your healthcare team cares about your health. To provide you with the best care, we have reviewed your chart and based on our findings, we see that you are due to:     PREVENTATIVE VISIT: Annual Medicare Wellness:Schedule an Annual Medicare Wellness Exam. Please call your [facility] to set up your appointment.    If you have already completed these items, please contact the clinic via phone or Mychart so your care team can review and update your records.  Thank you for choosing [facility] for your healthcare needs. For any questions, concerns, or to schedule an appointment please contact the clinic.     Healthy Regards,    Your [facility] Care Team

## 2025-02-18 NOTE — TELEPHONE ENCOUNTER
Patient Quality Outreach    Patient is due for the following:   Physical Annual Wellness Visit    Action(s) Taken:   Patient to call back and schedule an AWV    Type of outreach:    Sent [employer] message.    Questions for provider review:    None           Joy C Steinert, CMA

## 2025-04-05 ENCOUNTER — HEALTH MAINTENANCE LETTER (OUTPATIENT)
Age: 80
End: 2025-04-05

## 2025-05-13 ENCOUNTER — OFFICE VISIT (OUTPATIENT)
Dept: INTERNAL MEDICINE | Facility: CLINIC | Age: 80
End: 2025-05-13
Payer: MEDICARE

## 2025-05-13 VITALS
OXYGEN SATURATION: 95 % | SYSTOLIC BLOOD PRESSURE: 136 MMHG | RESPIRATION RATE: 16 BRPM | DIASTOLIC BLOOD PRESSURE: 82 MMHG | HEART RATE: 60 BPM | BODY MASS INDEX: 31.07 KG/M2 | TEMPERATURE: 97.6 F | WEIGHT: 182 LBS | HEIGHT: 64 IN

## 2025-05-13 DIAGNOSIS — E66.811 CLASS 1 OBESITY DUE TO EXCESS CALORIES WITHOUT SERIOUS COMORBIDITY WITH BODY MASS INDEX (BMI) OF 31.0 TO 31.9 IN ADULT: ICD-10-CM

## 2025-05-13 DIAGNOSIS — E66.09 CLASS 1 OBESITY DUE TO EXCESS CALORIES WITHOUT SERIOUS COMORBIDITY WITH BODY MASS INDEX (BMI) OF 31.0 TO 31.9 IN ADULT: ICD-10-CM

## 2025-05-13 DIAGNOSIS — Z00.00 ROUTINE GENERAL MEDICAL EXAMINATION AT A HEALTH CARE FACILITY: ICD-10-CM

## 2025-05-13 DIAGNOSIS — R73.03 PREDIABETES: ICD-10-CM

## 2025-05-13 DIAGNOSIS — S46.012S TRAUMATIC COMPLETE TEAR OF LEFT ROTATOR CUFF, SEQUELA: Primary | ICD-10-CM

## 2025-05-13 DIAGNOSIS — E78.5 HYPERLIPIDEMIA LDL GOAL <100: ICD-10-CM

## 2025-05-13 LAB
ERYTHROCYTE [DISTWIDTH] IN BLOOD BY AUTOMATED COUNT: 14.6 % (ref 10–15)
EST. AVERAGE GLUCOSE BLD GHB EST-MCNC: 111 MG/DL
HBA1C MFR BLD: 5.5 % (ref 0–5.6)
HCT VFR BLD AUTO: 41 % (ref 35–47)
HGB BLD-MCNC: 13.5 G/DL (ref 11.7–15.7)
MCH RBC QN AUTO: 29.8 PG (ref 26.5–33)
MCHC RBC AUTO-ENTMCNC: 32.9 G/DL (ref 31.5–36.5)
MCV RBC AUTO: 91 FL (ref 78–100)
PLATELET # BLD AUTO: 243 10E3/UL (ref 150–450)
RBC # BLD AUTO: 4.53 10E6/UL (ref 3.8–5.2)
WBC # BLD AUTO: 9.2 10E3/UL (ref 4–11)

## 2025-05-13 PROCEDURE — 85027 COMPLETE CBC AUTOMATED: CPT | Performed by: INTERNAL MEDICINE

## 2025-05-13 PROCEDURE — 83036 HEMOGLOBIN GLYCOSYLATED A1C: CPT | Performed by: INTERNAL MEDICINE

## 2025-05-13 PROCEDURE — 99213 OFFICE O/P EST LOW 20 MIN: CPT | Performed by: INTERNAL MEDICINE

## 2025-05-13 PROCEDURE — 3079F DIAST BP 80-89 MM HG: CPT | Performed by: INTERNAL MEDICINE

## 2025-05-13 PROCEDURE — 84443 ASSAY THYROID STIM HORMONE: CPT | Performed by: INTERNAL MEDICINE

## 2025-05-13 PROCEDURE — 3075F SYST BP GE 130 - 139MM HG: CPT | Performed by: INTERNAL MEDICINE

## 2025-05-13 PROCEDURE — 36415 COLL VENOUS BLD VENIPUNCTURE: CPT | Performed by: INTERNAL MEDICINE

## 2025-05-13 PROCEDURE — 80053 COMPREHEN METABOLIC PANEL: CPT | Performed by: INTERNAL MEDICINE

## 2025-05-13 PROCEDURE — 80061 LIPID PANEL: CPT | Performed by: INTERNAL MEDICINE

## 2025-05-13 PROCEDURE — G2211 COMPLEX E/M VISIT ADD ON: HCPCS | Performed by: INTERNAL MEDICINE

## 2025-05-13 RX ORDER — SIMVASTATIN 10 MG
10 TABLET ORAL AT BEDTIME
Qty: 90 TABLET | Refills: 2 | Status: SHIPPED | OUTPATIENT
Start: 2025-05-13
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
## 2025-05-15 ENCOUNTER — RESULTS FOLLOW-UP (OUTPATIENT)
Dept: INTERNAL MEDICINE | Facility: CLINIC | Age: 80
End: 2025-05-15

## 2025-05-15 LAB
ALBUMIN SERPL BCG-MCNC: 4.3 G/DL (ref 3.5–5.2)
ALP SERPL-CCNC: 92 U/L (ref 40–150)
ALT SERPL W P-5'-P-CCNC: 14 U/L (ref 0–50)
ANION GAP SERPL CALCULATED.3IONS-SCNC: 15 MMOL/L (ref 7–15)
AST SERPL W P-5'-P-CCNC: 36 U/L (ref 0–45)
BILIRUB SERPL-MCNC: 0.5 MG/DL
BUN SERPL-MCNC: 24.5 MG/DL (ref 8–23)
CALCIUM SERPL-MCNC: 9.7 MG/DL (ref 8.8–10.4)
CHLORIDE SERPL-SCNC: 105 MMOL/L (ref 98–107)
CHOLEST SERPL-MCNC: 195 MG/DL
CREAT SERPL-MCNC: 0.88 MG/DL (ref 0.51–0.95)
EGFRCR SERPLBLD CKD-EPI 2021: 67 ML/MIN/1.73M2
FASTING STATUS PATIENT QL REPORTED: YES
FASTING STATUS PATIENT QL REPORTED: YES
GLUCOSE SERPL-MCNC: 86 MG/DL (ref 70–99)
HCO3 SERPL-SCNC: 21 MMOL/L (ref 22–29)
HDLC SERPL-MCNC: 55 MG/DL
LDLC SERPL CALC-MCNC: 125 MG/DL
NONHDLC SERPL-MCNC: 140 MG/DL
POTASSIUM SERPL-SCNC: 4.3 MMOL/L (ref 3.4–5.3)
PROT SERPL-MCNC: 7.5 G/DL (ref 6.4–8.3)
SODIUM SERPL-SCNC: 141 MMOL/L (ref 135–145)
TRIGL SERPL-MCNC: 73 MG/DL
TSH SERPL DL<=0.005 MIU/L-ACNC: 2.17 UIU/ML (ref 0.3–4.2)

## 2025-05-26 ENCOUNTER — MYC MEDICAL ADVICE (OUTPATIENT)
Dept: INTERNAL MEDICINE | Facility: CLINIC | Age: 80
End: 2025-05-26
Payer: MEDICARE

## 2025-05-26 DIAGNOSIS — R41.3 MEMORY IMPAIRMENT OF GRADUAL ONSET: ICD-10-CM

## 2025-05-27 RX ORDER — DONEPEZIL HYDROCHLORIDE 5 MG/1
5 TABLET, FILM COATED ORAL AT BEDTIME
Qty: 90 TABLET | Refills: 3 | Status: SHIPPED | OUTPATIENT
Start: 2025-05-27

## 2025-08-21 ENCOUNTER — TRANSFERRED RECORDS (OUTPATIENT)
Dept: HEALTH INFORMATION MANAGEMENT | Facility: CLINIC | Age: 80
End: 2025-08-21
Payer: MEDICARE